# Patient Record
Sex: FEMALE | Race: WHITE | NOT HISPANIC OR LATINO | Employment: UNEMPLOYED | URBAN - NONMETROPOLITAN AREA
[De-identification: names, ages, dates, MRNs, and addresses within clinical notes are randomized per-mention and may not be internally consistent; named-entity substitution may affect disease eponyms.]

---

## 2021-08-03 ENCOUNTER — OFFICE VISIT (OUTPATIENT)
Dept: FAMILY MEDICINE | Facility: CLINIC | Age: 41
End: 2021-08-03
Payer: COMMERCIAL

## 2021-08-03 VITALS
WEIGHT: 138.38 LBS | HEART RATE: 110 BPM | OXYGEN SATURATION: 99 % | RESPIRATION RATE: 18 BRPM | TEMPERATURE: 98 F | SYSTOLIC BLOOD PRESSURE: 118 MMHG | DIASTOLIC BLOOD PRESSURE: 80 MMHG

## 2021-08-03 DIAGNOSIS — G43.909 MIGRAINE WITHOUT STATUS MIGRAINOSUS, NOT INTRACTABLE, UNSPECIFIED MIGRAINE TYPE: ICD-10-CM

## 2021-08-03 DIAGNOSIS — L02.01 CUTANEOUS ABSCESS OF FACE: ICD-10-CM

## 2021-08-03 DIAGNOSIS — F32.A DEPRESSION, UNSPECIFIED DEPRESSION TYPE: Primary | ICD-10-CM

## 2021-08-03 DIAGNOSIS — F41.9 ANXIETY: ICD-10-CM

## 2021-08-03 PROCEDURE — 99213 OFFICE O/P EST LOW 20 MIN: CPT | Mod: ,,, | Performed by: NURSE PRACTITIONER

## 2021-08-03 PROCEDURE — 99213 PR OFFICE/OUTPT VISIT, EST, LEVL III, 20-29 MIN: ICD-10-PCS | Mod: ,,, | Performed by: NURSE PRACTITIONER

## 2021-08-03 RX ORDER — CLINDAMYCIN HYDROCHLORIDE 300 MG/1
300 CAPSULE ORAL 2 TIMES DAILY
Qty: 20 CAPSULE | Refills: 0 | Status: SHIPPED | OUTPATIENT
Start: 2021-08-03 | End: 2021-12-21

## 2021-08-03 RX ORDER — PSEUDOEPHEDRINE HYDROCHLORIDE 60 MG/1
TABLET ORAL
COMMUNITY
Start: 2021-07-27 | End: 2021-08-03 | Stop reason: SDUPTHER

## 2021-08-03 RX ORDER — PSEUDOEPHEDRINE HYDROCHLORIDE 60 MG/1
TABLET ORAL
Qty: 60 TABLET | Refills: 5 | Status: SHIPPED | OUTPATIENT
Start: 2021-08-03 | End: 2022-02-23 | Stop reason: SDUPTHER

## 2021-08-03 RX ORDER — ESCITALOPRAM OXALATE 10 MG/1
10 TABLET ORAL NIGHTLY
Qty: 30 TABLET | Refills: 5 | Status: SHIPPED | OUTPATIENT
Start: 2021-08-03 | End: 2022-02-23 | Stop reason: DRUGHIGH

## 2021-08-03 RX ORDER — ESCITALOPRAM OXALATE 10 MG/1
10 TABLET ORAL NIGHTLY
COMMUNITY
Start: 2021-07-27 | End: 2021-08-03 | Stop reason: SDUPTHER

## 2021-08-03 RX ORDER — HYDROXYZINE PAMOATE 50 MG/1
CAPSULE ORAL
Qty: 90 CAPSULE | Refills: 1 | Status: SHIPPED | OUTPATIENT
Start: 2021-08-03 | End: 2021-09-13 | Stop reason: SDUPTHER

## 2021-08-03 RX ORDER — HYDROXYZINE PAMOATE 50 MG/1
CAPSULE ORAL
COMMUNITY
Start: 2021-07-17 | End: 2021-08-03 | Stop reason: SDUPTHER

## 2021-08-03 RX ORDER — TRAZODONE HYDROCHLORIDE 100 MG/1
100 TABLET ORAL NIGHTLY
Qty: 30 TABLET | Refills: 5 | Status: SHIPPED | OUTPATIENT
Start: 2021-08-03 | End: 2022-02-23 | Stop reason: SDUPTHER

## 2021-08-03 RX ORDER — TRAZODONE HYDROCHLORIDE 100 MG/1
100 TABLET ORAL NIGHTLY
COMMUNITY
Start: 2021-07-27 | End: 2021-08-03 | Stop reason: SDUPTHER

## 2021-09-13 DIAGNOSIS — F41.9 ANXIETY: ICD-10-CM

## 2021-09-13 RX ORDER — HYDROXYZINE PAMOATE 50 MG/1
CAPSULE ORAL
Qty: 90 CAPSULE | Refills: 1 | Status: SHIPPED | OUTPATIENT
Start: 2021-09-13 | End: 2021-10-18 | Stop reason: SDUPTHER

## 2021-10-18 DIAGNOSIS — F41.9 ANXIETY: ICD-10-CM

## 2021-10-18 RX ORDER — HYDROXYZINE PAMOATE 50 MG/1
CAPSULE ORAL
Qty: 90 CAPSULE | Refills: 1 | Status: SHIPPED | OUTPATIENT
Start: 2021-10-18 | End: 2021-12-21 | Stop reason: SDUPTHER

## 2021-12-21 DIAGNOSIS — F41.9 ANXIETY: ICD-10-CM

## 2021-12-21 RX ORDER — HYDROXYZINE PAMOATE 50 MG/1
CAPSULE ORAL
Qty: 90 CAPSULE | Refills: 2 | Status: SHIPPED | OUTPATIENT
Start: 2021-12-21 | End: 2022-06-06 | Stop reason: SDUPTHER

## 2022-01-27 ENCOUNTER — OFFICE VISIT (OUTPATIENT)
Dept: FAMILY MEDICINE | Facility: CLINIC | Age: 42
End: 2022-01-27
Payer: COMMERCIAL

## 2022-01-27 VITALS
SYSTOLIC BLOOD PRESSURE: 110 MMHG | HEART RATE: 99 BPM | TEMPERATURE: 98 F | RESPIRATION RATE: 18 BRPM | DIASTOLIC BLOOD PRESSURE: 80 MMHG | HEIGHT: 57 IN | OXYGEN SATURATION: 96 % | BODY MASS INDEX: 29.77 KG/M2 | WEIGHT: 138 LBS

## 2022-01-27 DIAGNOSIS — J06.9 UPPER RESPIRATORY TRACT INFECTION, UNSPECIFIED TYPE: Primary | ICD-10-CM

## 2022-01-27 DIAGNOSIS — U07.1 COVID-19: ICD-10-CM

## 2022-01-27 PROCEDURE — 99213 PR OFFICE/OUTPT VISIT, EST, LEVL III, 20-29 MIN: ICD-10-PCS | Mod: ,,, | Performed by: NURSE PRACTITIONER

## 2022-01-27 PROCEDURE — 99213 OFFICE O/P EST LOW 20 MIN: CPT | Mod: ,,, | Performed by: NURSE PRACTITIONER

## 2022-01-27 RX ORDER — AZITHROMYCIN 250 MG/1
TABLET, FILM COATED ORAL
Qty: 6 TABLET | Refills: 0 | Status: SHIPPED | OUTPATIENT
Start: 2022-01-27 | End: 2022-02-01

## 2022-01-27 NOTE — LETTER
January 27, 2022      [facility]  [address]  [place] [address]  Phone: [phone]  Fax: [phone]       Patient: Jaleesa Moreira   YOB: 1980  Date of Visit: 01/27/2022    To Whom It May Concern:    Carolina Moreira  was at [facility] on 01/27/2022. The patient may return to work/school on 01/31/2022 if symptoms have resolved with no restrictions. If you have any questions or/ concerns, or if I can be of further assistance, please do not hesitate to contact me.    Sincerely,    MADIHA Vilallba   /

## 2022-01-27 NOTE — PROGRESS NOTES
MADIHA Villalba   [place] [address]  [place]  [address]      PATIENT NAME: Jaleesa Moreira  : 1980  DATE: 22  MRN: [id number]      Billing Provider: MADIHA Villalba  Level of Service:   Patient PCP Information     Provider PCP Type    MADIHA Sahu General          Reason for Visit / Chief Complaint: Headache (Sinus pressure x 1 week.), Nasal Congestion (Denies fever, n/v/d or loss of smell or taste.  Daughter tested positive for Covid last week.), and Ear Fullness       Update PCP  Update Chief Complaint         History of Present Illness / Problem Focused Workflow     Jaleesa Moreira presents to the clinic with Headache (Sinus pressure x 1 week.), Nasal Congestion (Denies fever, n/v/d or loss of smell or taste.  Daughter tested positive for Covid last week.), and Ear Fullness     Patient presents to clinic with c/o congestion, sinus pressure, ears pressure x one week.       Review of Systems     @Review of Systems   HENT: Positive for nasal congestion, postnasal drip, rhinorrhea and sinus pressure/congestion. Negative for ear pain.    Respiratory: Negative for cough.    Cardiovascular: Negative for chest pain.   Neurological: Positive for headaches.       Medical / Social / Family History     Past Medical History:   Diagnosis Date    Anxiety        Past Surgical History:   Procedure Laterality Date    HYSTERECTOMY      TONSILLECTOMY         Social History  Ms.  reports that she has been smoking cigarettes. She has never used smokeless tobacco. She reports previous alcohol use. She reports that she does not use drugs.    Family History  Ms.'s family history includes Hyperlipidemia in her father; Thyroid disease in her father.    Medications and Allergies     Medications  Outpatient Medications Marked as Taking for the 22 encounter (Office Visit) with MADIHA Villalba   Medication Sig Dispense Refill    EScitalopram oxalate (LEXAPRO) 10 MG tablet Take  1 tablet (10 mg total) by mouth nightly. 30 tablet 5    hydrOXYzine pamoate (VISTARIL) 50 MG Cap TAKE 2 CAPSULES BY MOUTH EVERY 4 TO 6 HOURS AS NEEDED FOR ANXIETY 90 capsule 2    SUDOGEST 60 mg tablet TAKE 1 TABLET BY MOUTH EVERY TWELVE HOURS AS NEEDED 60 tablet 5    sumatriptan (IMITREX) 50 MG tablet TAKE 1 TABLET BY MOUTH at onset OF HEADACHE, REPEAT EVERY 2 hours UNTIL RELIEF, max 4 TABLETS in 24 hours      traZODone (DESYREL) 100 MG tablet Take 1 tablet (100 mg total) by mouth nightly. 30 tablet 5       Allergies  Review of patient's allergies indicates:  No Known Allergies    Physical Examination     Vitals:    01/27/22 1022   BP: 110/80   Pulse: 99   Resp: 18   Temp: 98.4 °F (36.9 °C)     Physical Exam  Constitutional:       General: She is not in acute distress.     Appearance: Normal appearance.   Cardiovascular:      Rate and Rhythm: Normal rate and regular rhythm.   Pulmonary:      Effort: Pulmonary effort is normal. No respiratory distress.      Breath sounds: Normal breath sounds. No wheezing, rhonchi or rales.   Skin:     General: Skin is warm and dry.   Neurological:      Mental Status: She is alert.               No results found for: WBC, HGB, HCT, MCV, PLT       No results found for: NA, K, CL, CO2, GLU, BUN, CREATININE, CALCIUM, PROT, ALBUMIN, BILITOT, ALKPHOS, AST, ALT, ANIONGAP, ESTGFRAFRICA, EGFRNONAA   No image results found.     Procedures   Assessment and Plan (including Health Maintenance)      Problem List  Smart Sets  Document Outside HM   :    Plan:           Problem List Items Addressed This Visit        ENT    Upper respiratory tract infection - Primary    Relevant Medications    azithromycin (Z-MARIE) 250 MG tablet       Other    COVID-19    Current Assessment & Plan     Vitamin d, c , zinc  otc antihistamine / decongestant  Push fluids  Maintain physical activity                The patient has no Health Maintenance topics of status Not Due    No future appointments.     Health  Maintenance Due   Topic Date Due    Hepatitis C Screening  Never done    Lipid Panel  Never done    COVID-19 Vaccine (1) Never done    Pneumococcal Vaccines (Age 0-64) (1 of 2 - PPSV23) Never done    HIV Screening  Never done    TETANUS VACCINE  Never done    Influenza Vaccine (1) Never done        Follow up if symptoms worsen or fail to improve.     Signature:  MADIHA Villalba  [facility]  [address] [address] [place]  [address]    Date of encounter: 1/27/22

## 2022-02-23 ENCOUNTER — OFFICE VISIT (OUTPATIENT)
Dept: FAMILY MEDICINE | Facility: CLINIC | Age: 42
End: 2022-02-23
Payer: COMMERCIAL

## 2022-02-23 VITALS
OXYGEN SATURATION: 98 % | DIASTOLIC BLOOD PRESSURE: 80 MMHG | SYSTOLIC BLOOD PRESSURE: 122 MMHG | WEIGHT: 138.63 LBS | BODY MASS INDEX: 29.91 KG/M2 | HEART RATE: 90 BPM | RESPIRATION RATE: 20 BRPM | HEIGHT: 57 IN | TEMPERATURE: 98 F

## 2022-02-23 DIAGNOSIS — Z79.899 ENCOUNTER FOR LONG-TERM (CURRENT) USE OF MEDICATIONS: ICD-10-CM

## 2022-02-23 DIAGNOSIS — F41.9 ANXIETY: ICD-10-CM

## 2022-02-23 DIAGNOSIS — F32.A DEPRESSION, UNSPECIFIED DEPRESSION TYPE: Primary | ICD-10-CM

## 2022-02-23 DIAGNOSIS — G43.909 MIGRAINE WITHOUT STATUS MIGRAINOSUS, NOT INTRACTABLE, UNSPECIFIED MIGRAINE TYPE: ICD-10-CM

## 2022-02-23 LAB
ALBUMIN SERPL BCP-MCNC: 3.8 G/DL (ref 3.5–5)
ALBUMIN/GLOB SERPL: 1 {RATIO}
ALP SERPL-CCNC: 105 U/L (ref 37–98)
ALT SERPL W P-5'-P-CCNC: 25 U/L (ref 13–56)
ANION GAP SERPL CALCULATED.3IONS-SCNC: 9 MMOL/L (ref 7–16)
AST SERPL W P-5'-P-CCNC: 19 U/L (ref 15–37)
BASOPHILS # BLD AUTO: 0.08 K/UL (ref 0–0.2)
BASOPHILS NFR BLD AUTO: 0.7 % (ref 0–1)
BILIRUB SERPL-MCNC: 0.3 MG/DL (ref 0–1.2)
BUN SERPL-MCNC: 8 MG/DL (ref 7–18)
BUN/CREAT SERPL: 11 (ref 6–20)
CALCIUM SERPL-MCNC: 9.3 MG/DL (ref 8.5–10.1)
CHLORIDE SERPL-SCNC: 107 MMOL/L (ref 98–107)
CHOLEST SERPL-MCNC: 268 MG/DL (ref 0–200)
CHOLEST/HDLC SERPL: 7.9 {RATIO}
CO2 SERPL-SCNC: 27 MMOL/L (ref 21–32)
CREAT SERPL-MCNC: 0.72 MG/DL (ref 0.55–1.02)
DIFFERENTIAL METHOD BLD: ABNORMAL
EOSINOPHIL # BLD AUTO: 0.23 K/UL (ref 0–0.5)
EOSINOPHIL NFR BLD AUTO: 1.9 % (ref 1–4)
ERYTHROCYTE [DISTWIDTH] IN BLOOD BY AUTOMATED COUNT: 13.6 % (ref 11.5–14.5)
GLOBULIN SER-MCNC: 4 G/DL (ref 2–4)
GLUCOSE SERPL-MCNC: 89 MG/DL (ref 74–106)
HCT VFR BLD AUTO: 44.8 % (ref 38–47)
HDLC SERPL-MCNC: 34 MG/DL (ref 40–60)
HGB BLD-MCNC: 15.4 G/DL (ref 12–16)
IMM GRANULOCYTES # BLD AUTO: 0.09 K/UL (ref 0–0.04)
IMM GRANULOCYTES NFR BLD: 0.7 % (ref 0–0.4)
LDLC SERPL CALC-MCNC: ABNORMAL MG/DL
LDLC/HDLC SERPL: ABNORMAL {RATIO}
LYMPHOCYTES # BLD AUTO: 4.16 K/UL (ref 1–4.8)
LYMPHOCYTES NFR BLD AUTO: 33.8 % (ref 27–41)
MCH RBC QN AUTO: 31.1 PG (ref 27–31)
MCHC RBC AUTO-ENTMCNC: 34.4 G/DL (ref 32–36)
MCV RBC AUTO: 90.5 FL (ref 80–96)
MONOCYTES # BLD AUTO: 0.7 K/UL (ref 0–0.8)
MONOCYTES NFR BLD AUTO: 5.7 % (ref 2–6)
MPC BLD CALC-MCNC: 9.3 FL (ref 9.4–12.4)
NEUTROPHILS # BLD AUTO: 7.04 K/UL (ref 1.8–7.7)
NEUTROPHILS NFR BLD AUTO: 57.2 % (ref 53–65)
NONHDLC SERPL-MCNC: 234 MG/DL
NRBC # BLD AUTO: 0 X10E3/UL
NRBC, AUTO (.00): 0 %
PLATELET # BLD AUTO: 502 K/UL (ref 150–400)
POTASSIUM SERPL-SCNC: 3.8 MMOL/L (ref 3.5–5.1)
PROT SERPL-MCNC: 7.8 G/DL (ref 6.4–8.2)
RBC # BLD AUTO: 4.95 M/UL (ref 4.2–5.4)
SODIUM SERPL-SCNC: 139 MMOL/L (ref 136–145)
TRIGL SERPL-MCNC: 520 MG/DL (ref 35–150)
VLDLC SERPL-MCNC: ABNORMAL MG/DL
WBC # BLD AUTO: 12.3 K/UL (ref 4.5–11)

## 2022-02-23 PROCEDURE — 80061 LIPID PANEL: ICD-10-PCS | Mod: ,,, | Performed by: CLINICAL MEDICAL LABORATORY

## 2022-02-23 PROCEDURE — 80053 COMPREHENSIVE METABOLIC PANEL: ICD-10-PCS | Mod: ,,, | Performed by: CLINICAL MEDICAL LABORATORY

## 2022-02-23 PROCEDURE — 99213 OFFICE O/P EST LOW 20 MIN: CPT | Mod: ,,, | Performed by: NURSE PRACTITIONER

## 2022-02-23 PROCEDURE — 80053 COMPREHEN METABOLIC PANEL: CPT | Mod: ,,, | Performed by: CLINICAL MEDICAL LABORATORY

## 2022-02-23 PROCEDURE — 85025 COMPLETE CBC W/AUTO DIFF WBC: CPT | Mod: ,,, | Performed by: CLINICAL MEDICAL LABORATORY

## 2022-02-23 PROCEDURE — 99213 PR OFFICE/OUTPT VISIT, EST, LEVL III, 20-29 MIN: ICD-10-PCS | Mod: ,,, | Performed by: NURSE PRACTITIONER

## 2022-02-23 PROCEDURE — 80061 LIPID PANEL: CPT | Mod: ,,, | Performed by: CLINICAL MEDICAL LABORATORY

## 2022-02-23 PROCEDURE — 85025 CBC WITH DIFFERENTIAL: ICD-10-PCS | Mod: ,,, | Performed by: CLINICAL MEDICAL LABORATORY

## 2022-02-23 RX ORDER — PSEUDOEPHEDRINE HYDROCHLORIDE 60 MG/1
TABLET ORAL
Qty: 60 TABLET | Refills: 5 | Status: SHIPPED | OUTPATIENT
Start: 2022-02-23 | End: 2022-06-06 | Stop reason: SDUPTHER

## 2022-02-23 RX ORDER — ESCITALOPRAM OXALATE 20 MG/1
20 TABLET ORAL DAILY
Qty: 30 TABLET | Refills: 5 | Status: SHIPPED | OUTPATIENT
Start: 2022-02-23 | End: 2022-06-06 | Stop reason: SDUPTHER

## 2022-02-23 RX ORDER — TRAZODONE HYDROCHLORIDE 100 MG/1
100 TABLET ORAL NIGHTLY
Qty: 30 TABLET | Refills: 5 | Status: SHIPPED | OUTPATIENT
Start: 2022-02-23 | End: 2022-06-06 | Stop reason: SDUPTHER

## 2022-02-23 RX ORDER — SUMATRIPTAN 50 MG/1
TABLET, FILM COATED ORAL
Qty: 9 TABLET | Refills: 5 | Status: SHIPPED | OUTPATIENT
Start: 2022-02-23 | End: 2022-06-06 | Stop reason: SDUPTHER

## 2022-02-23 RX ORDER — HYDROXYZINE PAMOATE 50 MG/1
CAPSULE ORAL
Qty: 90 CAPSULE | Refills: 2 | Status: CANCELLED | OUTPATIENT
Start: 2022-02-23

## 2022-02-23 RX ORDER — ESCITALOPRAM OXALATE 10 MG/1
10 TABLET ORAL NIGHTLY
Qty: 30 TABLET | Refills: 5 | Status: CANCELLED | OUTPATIENT
Start: 2022-02-23

## 2022-02-23 NOTE — PROGRESS NOTES
MADIHA Sahu   [facility]  [address]  [place] [address]     PATIENT NAME: Jaleesa Moreira  : 1980  DATE: 22  MRN: [id number]      Billing Provider: MADIHA Sahu  Level of Service: GA OFFICE/OUTPT VISIT, EST, LEVL III, 20-29 MIN  Patient PCP Information     Provider PCP Type    MADIHA Sahu General          Reason for Visit / Chief Complaint: Depression (Here for check up and med refills.)       Update PCP  Update Chief Complaint         History of Present Illness / Problem Focused Workflow     Jaleesa Moreira presents to the clinic for follow up on chronic conditions.  Hx of depression      Review of Systems     Review of Systems   Constitutional: Negative.  Negative for activity change, appetite change and unexpected weight change.   Eyes: Negative for visual disturbance.   Respiratory: Negative for chest tightness and shortness of breath.    Cardiovascular: Negative for chest pain.   Gastrointestinal: Negative for abdominal pain, change in bowel habit, nausea, vomiting and change in bowel habit.   Neurological: Negative for weakness and headaches.   Psychiatric/Behavioral: Negative for behavioral problems.        Medical / Social / Family History     Past Medical History:   Diagnosis Date    Anxiety     Migraines        Past Surgical History:   Procedure Laterality Date    HYSTERECTOMY      TONSILLECTOMY         Social History  Ms.  reports that she has been smoking cigarettes. She has never used smokeless tobacco. She reports previous alcohol use. She reports that she does not use drugs.    Family History  Ms.'s family history includes Hyperlipidemia in her father; Thyroid disease in her father.    Medications and Allergies     Medications  Outpatient Medications Marked as Taking for the 22 encounter (Office Visit) with MADIHA [place]   Medication Sig Dispense Refill    hydrOXYzine pamoate (VISTARIL) 50 MG Cap TAKE 2 CAPSULES BY  "MOUTH EVERY 4 TO 6 HOURS AS NEEDED FOR ANXIETY 90 capsule 2    [DISCONTINUED] EScitalopram oxalate (LEXAPRO) 10 MG tablet Take 1 tablet (10 mg total) by mouth nightly. 30 tablet 5    [DISCONTINUED] SUDOGEST 60 mg tablet TAKE 1 TABLET BY MOUTH EVERY TWELVE HOURS AS NEEDED 60 tablet 5    [DISCONTINUED] sumatriptan (IMITREX) 50 MG tablet TAKE 1 TABLET BY MOUTH at onset OF HEADACHE, REPEAT EVERY 2 hours UNTIL RELIEF, max 4 TABLETS in 24 hours      [DISCONTINUED] traZODone (DESYREL) 100 MG tablet Take 1 tablet (100 mg total) by mouth nightly. 30 tablet 5       Allergies  Review of patient's allergies indicates:  No Known Allergies    Physical Examination     Vitals:    02/23/22 1445   BP: 122/80   BP Location: Right arm   Patient Position: Sitting   BP Method: Large (Manual)   Pulse: 90   Resp: 20   Temp: 98 °F (36.7 °C)   TempSrc: Temporal   SpO2: 98%   Weight: 62.9 kg (138 lb 9.6 oz)   Height: 4' 9" (1.448 m)      Physical Exam  Constitutional:       General: She is not in acute distress.     Appearance: Normal appearance.   Eyes:      Pupils: Pupils are equal, round, and reactive to light.   Neck:      Thyroid: No thyromegaly.      Vascular: No carotid bruit.   Cardiovascular:      Rate and Rhythm: Normal rate and regular rhythm.      Pulses:           Carotid pulses are 3+ on the right side and 3+ on the left side.       Posterior tibial pulses are 3+ on the right side and 3+ on the left side.      Heart sounds: Normal heart sounds. No murmur heard.  Pulmonary:      Effort: Pulmonary effort is normal. No tachypnea, accessory muscle usage or respiratory distress.      Breath sounds: Normal breath sounds. No wheezing, rhonchi or rales.   Abdominal:      General: Bowel sounds are normal.      Palpations: Abdomen is soft.   Musculoskeletal:         General: Normal range of motion.      Cervical back: Neck supple.      Right lower leg: No edema.      Left lower leg: No edema.   Skin:     General: Skin is warm and dry. "      Coloration: Skin is not jaundiced or pale.   Neurological:      Mental Status: She is alert and oriented to person, place, and time.      Cranial Nerves: Cranial nerves are intact.      Gait: Gait is intact.   Psychiatric:         Attention and Perception: Attention normal.         Mood and Affect: Mood normal. Mood is not anxious.         Speech: Speech normal.         Behavior: Behavior is cooperative.         Thought Content: Thought content does not include suicidal ideation.         Judgment: Judgment normal.          Assessment and Plan (including Health Maintenance)      Problem List  Smart Sets  Document Outside HM   :      Health Maintenance Due   Topic Date Due    Hepatitis C Screening  Never done    COVID-19 Vaccine (1) Never done    Pneumococcal Vaccines (Age 0-64) (1 of 2 - PPSV23) Never done    HIV Screening  Never done    TETANUS VACCINE  Never done    Influenza Vaccine (1) Never done       Problem List Items Addressed This Visit    None     Visit Diagnoses     Depression, unspecified depression type    -  Primary    Will check routine labs and continue current medications.     Relevant Medications    traZODone (DESYREL) 100 MG tablet    Anxiety        Relevant Orders    CBC Auto Differential (Completed)    Comprehensive Metabolic Panel (Completed)    Migraine without status migrainosus, not intractable, unspecified migraine type        Relevant Medications    SUDOGEST 60 mg tablet    Encounter for long-term (current) use of medications        Relevant Orders    CBC Auto Differential (Completed)    Comprehensive Metabolic Panel (Completed)    Lipid Panel (Completed)        Depression, unspecified depression type  Comments:  Will check routine labs and continue current medications.   Orders:  -     traZODone (DESYREL) 100 MG tablet; Take 1 tablet (100 mg total) by mouth nightly.  Dispense: 30 tablet; Refill: 5    Anxiety  -     CBC Auto Differential; Future; Expected date: 02/23/2022  -      Comprehensive Metabolic Panel; Future; Expected date: 02/23/2022    Migraine without status migrainosus, not intractable, unspecified migraine type  -     SUDOGEST 60 mg tablet; TAKE 1 TABLET BY MOUTH EVERY TWELVE HOURS AS NEEDED  Dispense: 60 tablet; Refill: 5    Encounter for long-term (current) use of medications  -     CBC Auto Differential; Future; Expected date: 02/23/2022  -     Comprehensive Metabolic Panel; Future; Expected date: 02/23/2022  -     Lipid Panel; Future; Expected date: 02/23/2022    Other orders  -     sumatriptan (IMITREX) 50 MG tablet; TAKE 1 TABLET BY MOUTH at onset OF HEADACHE, REPEAT EVERY 2 hours UNTIL RELIEF, max 4 TABLETS in 24 hours  Dispense: 9 tablet; Refill: 5  -     EScitalopram oxalate (LEXAPRO) 20 MG tablet; Take 1 tablet (20 mg total) by mouth once daily.  Dispense: 30 tablet; Refill: 5       The patient has no Health Maintenance topics of status Not Due    Follow up in about 6 months (around 8/23/2022).     Signature:  MADIHA Sahu    Date of encounter: 2/23/22

## 2022-02-27 RX ORDER — ROSUVASTATIN CALCIUM 10 MG/1
10 TABLET, COATED ORAL DAILY
Qty: 90 TABLET | Refills: 0 | Status: SHIPPED | OUTPATIENT
Start: 2022-02-27 | End: 2022-06-03 | Stop reason: SDUPTHER

## 2022-06-06 ENCOUNTER — OFFICE VISIT (OUTPATIENT)
Dept: FAMILY MEDICINE | Facility: CLINIC | Age: 42
End: 2022-06-06
Payer: COMMERCIAL

## 2022-06-06 VITALS
OXYGEN SATURATION: 98 % | WEIGHT: 133.63 LBS | TEMPERATURE: 99 F | HEART RATE: 90 BPM | HEIGHT: 57 IN | SYSTOLIC BLOOD PRESSURE: 118 MMHG | RESPIRATION RATE: 20 BRPM | DIASTOLIC BLOOD PRESSURE: 84 MMHG | BODY MASS INDEX: 28.83 KG/M2

## 2022-06-06 DIAGNOSIS — F32.A DEPRESSION, UNSPECIFIED DEPRESSION TYPE: ICD-10-CM

## 2022-06-06 DIAGNOSIS — G43.909 MIGRAINE WITHOUT STATUS MIGRAINOSUS, NOT INTRACTABLE, UNSPECIFIED MIGRAINE TYPE: ICD-10-CM

## 2022-06-06 DIAGNOSIS — G47.00 INSOMNIA, UNSPECIFIED TYPE: ICD-10-CM

## 2022-06-06 DIAGNOSIS — L98.9 SKIN LESION: ICD-10-CM

## 2022-06-06 DIAGNOSIS — F41.9 ANXIETY: ICD-10-CM

## 2022-06-06 DIAGNOSIS — E78.2 MIXED HYPERLIPIDEMIA: Primary | ICD-10-CM

## 2022-06-06 LAB
CHOLEST SERPL-MCNC: 220 MG/DL (ref 0–200)
CHOLEST/HDLC SERPL: 5 {RATIO}
HDLC SERPL-MCNC: 44 MG/DL (ref 40–60)
LDLC SERPL CALC-MCNC: 114 MG/DL
LDLC/HDLC SERPL: 2.6 {RATIO}
NONHDLC SERPL-MCNC: 176 MG/DL
TRIGL SERPL-MCNC: 309 MG/DL (ref 35–150)
VLDLC SERPL-MCNC: 62 MG/DL

## 2022-06-06 PROCEDURE — 99213 OFFICE O/P EST LOW 20 MIN: CPT | Mod: ,,, | Performed by: NURSE PRACTITIONER

## 2022-06-06 PROCEDURE — 80061 LIPID PANEL: CPT | Mod: ,,, | Performed by: CLINICAL MEDICAL LABORATORY

## 2022-06-06 PROCEDURE — 99213 PR OFFICE/OUTPT VISIT, EST, LEVL III, 20-29 MIN: ICD-10-PCS | Mod: ,,, | Performed by: NURSE PRACTITIONER

## 2022-06-06 PROCEDURE — 80061 LIPID PANEL: ICD-10-PCS | Mod: ,,, | Performed by: CLINICAL MEDICAL LABORATORY

## 2022-06-06 RX ORDER — SUMATRIPTAN 50 MG/1
TABLET, FILM COATED ORAL
Qty: 9 TABLET | Refills: 5 | Status: SHIPPED | OUTPATIENT
Start: 2022-06-06 | End: 2024-03-04

## 2022-06-06 RX ORDER — ROSUVASTATIN CALCIUM 10 MG/1
10 TABLET, COATED ORAL DAILY
Qty: 90 TABLET | Refills: 1 | Status: SHIPPED | OUTPATIENT
Start: 2022-06-06 | End: 2022-12-09

## 2022-06-06 RX ORDER — PSEUDOEPHEDRINE HYDROCHLORIDE 60 MG/1
TABLET ORAL
Qty: 60 TABLET | Refills: 5 | Status: SHIPPED | OUTPATIENT
Start: 2022-06-06 | End: 2023-01-09

## 2022-06-06 RX ORDER — TRAZODONE HYDROCHLORIDE 100 MG/1
100 TABLET ORAL NIGHTLY
Qty: 30 TABLET | Refills: 5 | Status: SHIPPED | OUTPATIENT
Start: 2022-06-06 | End: 2023-01-09 | Stop reason: SDUPTHER

## 2022-06-06 RX ORDER — HYDROXYZINE PAMOATE 50 MG/1
CAPSULE ORAL
Qty: 90 CAPSULE | Refills: 2 | Status: SHIPPED | OUTPATIENT
Start: 2022-06-06 | End: 2022-10-07 | Stop reason: SDUPTHER

## 2022-06-06 RX ORDER — ESCITALOPRAM OXALATE 20 MG/1
20 TABLET ORAL DAILY
Qty: 30 TABLET | Refills: 5 | Status: SHIPPED | OUTPATIENT
Start: 2022-06-06 | End: 2023-01-09 | Stop reason: SDUPTHER

## 2022-06-06 NOTE — PROGRESS NOTES
MADIHA Sahu   [place] [facility]  [address]  [place] [address]     PATIENT NAME: Jaleesa Moreira  : 1980  DATE: 22  MRN: [id number]      Billing Provider: MADIHA Sahu  Level of Service: NE OFFICE/OUTPT VISIT, EST, LEVL III, 20-29 MIN  Patient PCP Information     Provider PCP Type    MADIHA Sahu General          Reason for Visit / Chief Complaint: Medication Refill, 3 month checkup, and Generalized Body Aches       Update PCP  Update Chief Complaint         History of Present Illness / Problem Focused Workflow     Jaleesa Moreira presents to the clinic for refills on routine medication.      Review of Systems     Review of Systems   Constitutional: Negative.  Negative for activity change, appetite change and unexpected weight change.   Eyes: Negative for visual disturbance.   Respiratory: Negative for cough, chest tightness and shortness of breath.    Cardiovascular: Negative for chest pain, palpitations and leg swelling.   Gastrointestinal: Negative for abdominal pain, nausea and vomiting.   Endocrine: Negative for cold intolerance, heat intolerance, polydipsia, polyphagia and polyuria.   Genitourinary: Negative for frequency.   Neurological: Negative for dizziness, weakness and headaches.        Medical / Social / Family History     Past Medical History:   Diagnosis Date    Anxiety     Migraines        Past Surgical History:   Procedure Laterality Date    HYSTERECTOMY      TONSILLECTOMY         Social History  Ms.  reports that she has been smoking cigarettes. She has never used smokeless tobacco. She reports previous alcohol use. She reports that she does not use drugs.    Family History  Ms.'s family history includes Hyperlipidemia in her father; Thyroid disease in her father.    Medications and Allergies     Medications  Outpatient Medications Marked as Taking for the 22 encounter (Office Visit) with MADIHA Espana   Medication Sig  "Dispense Refill    [DISCONTINUED] EScitalopram oxalate (LEXAPRO) 20 MG tablet Take 1 tablet (20 mg total) by mouth once daily. 30 tablet 5    [DISCONTINUED] hydrOXYzine pamoate (VISTARIL) 50 MG Cap TAKE 2 CAPSULES BY MOUTH EVERY 4 TO 6 HOURS AS NEEDED FOR ANXIETY 90 capsule 2    [DISCONTINUED] rosuvastatin (CRESTOR) 10 MG tablet TAKE 1 TABLET BY MOUTH DAILY 90 tablet 0    [DISCONTINUED] SUDOGEST 60 mg tablet TAKE 1 TABLET BY MOUTH EVERY TWELVE HOURS AS NEEDED 60 tablet 5    [DISCONTINUED] sumatriptan (IMITREX) 50 MG tablet TAKE 1 TABLET BY MOUTH at onset OF HEADACHE, REPEAT EVERY 2 hours UNTIL RELIEF, max 4 TABLETS in 24 hours 9 tablet 5    [DISCONTINUED] traZODone (DESYREL) 100 MG tablet Take 1 tablet (100 mg total) by mouth nightly. 30 tablet 5       Allergies  Review of patient's allergies indicates:  No Known Allergies    Physical Examination     Vitals:    06/06/22 1328   BP: 118/84   BP Location: Right arm   Patient Position: Sitting   BP Method: Medium (Manual)   Pulse: 90   Resp: 20   Temp: 98.5 °F (36.9 °C)   TempSrc: Oral   SpO2: 98%   Weight: 60.6 kg (133 lb 9.6 oz)   Height: 4' 9" (1.448 m)      Physical Exam  Constitutional:       General: She is not in acute distress.     Appearance: Normal appearance.   HENT:      Nose: No congestion.   Neck:      Thyroid: No thyromegaly.      Vascular: No carotid bruit.   Cardiovascular:      Rate and Rhythm: Normal rate and regular rhythm.      Heart sounds: No murmur heard.  Pulmonary:      Effort: Pulmonary effort is normal. No accessory muscle usage or respiratory distress.      Breath sounds: Normal breath sounds. No wheezing, rhonchi or rales.   Abdominal:      General: Bowel sounds are normal. There is no distension.      Palpations: Abdomen is soft.      Tenderness: There is no abdominal tenderness.   Musculoskeletal:         General: Normal range of motion.      Cervical back: Neck supple.   Skin:     General: Skin is warm and dry.      Capillary " Refill: Capillary refill takes less than 2 seconds.      Coloration: Skin is not jaundiced or pale.      Findings: Lesion present.          Neurological:      Mental Status: She is alert and oriented to person, place, and time.      Cranial Nerves: Cranial nerves are intact.      Gait: Gait is intact.   Psychiatric:         Mood and Affect: Mood normal.         Behavior: Behavior normal.         Thought Content: Thought content normal. Thought content does not include suicidal ideation.          Assessment and Plan (including Health Maintenance)      Problem List  Smart Sets  Document Outside HM   :        There are no preventive care reminders to display for this patient.    Problem List Items Addressed This Visit    None     Visit Diagnoses     Mixed hyperlipidemia    -  Primary    Fasting lipids ordered, continue current medication and LFLC diet. LDL goal < 70    Relevant Medications    rosuvastatin (CRESTOR) 10 MG tablet    Other Relevant Orders    Lipid Panel    Anxiety        Relevant Medications    hydrOXYzine pamoate (VISTARIL) 50 MG Cap    Depression, unspecified depression type        Will check routine labs and continue current medications.     Relevant Medications    EScitalopram oxalate (LEXAPRO) 20 MG tablet    Migraine without status migrainosus, not intractable, unspecified migraine type        Relevant Medications    sumatriptan (IMITREX) 50 MG tablet    SUDOGEST 60 mg tablet    Skin lesion        Will refer to dermatologist since lesion is big.    Relevant Orders    Ambulatory referral/consult to Dermatology    Insomnia, unspecified type        Relevant Medications    traZODone (DESYREL) 100 MG tablet        Mixed hyperlipidemia  Comments:  Fasting lipids ordered, continue current medication and LFLC diet. LDL goal < 70  Orders:  -     rosuvastatin (CRESTOR) 10 MG tablet; Take 1 tablet (10 mg total) by mouth once daily.  Dispense: 90 tablet; Refill: 1  -     Lipid Panel; Future; Expected date:  06/06/2022    Anxiety  -     hydrOXYzine pamoate (VISTARIL) 50 MG Cap; TAKE 2 CAPSULES BY MOUTH EVERY 4 TO 6 HOURS AS NEEDED FOR ANXIETY  Dispense: 90 capsule; Refill: 2    Depression, unspecified depression type  Comments:  Will check routine labs and continue current medications.   Orders:  -     EScitalopram oxalate (LEXAPRO) 20 MG tablet; Take 1 tablet (20 mg total) by mouth once daily.  Dispense: 30 tablet; Refill: 5    Migraine without status migrainosus, not intractable, unspecified migraine type  -     sumatriptan (IMITREX) 50 MG tablet; TAKE 1 TABLET BY MOUTH at onset OF HEADACHE, REPEAT EVERY 2 hours UNTIL RELIEF, max 4 TABLETS in 24 hours  Dispense: 9 tablet; Refill: 5  -     SUDOGEST 60 mg tablet; TAKE 1 TABLET BY MOUTH EVERY TWELVE HOURS AS NEEDED  Dispense: 60 tablet; Refill: 5    Skin lesion  Comments:  Will refer to dermatologist since lesion is big.  Orders:  -     Ambulatory referral/consult to Dermatology; Future; Expected date: 06/13/2022    Insomnia, unspecified type  -     traZODone (DESYREL) 100 MG tablet; Take 1 tablet (100 mg total) by mouth nightly.  Dispense: 30 tablet; Refill: 5       Health Maintenance Topics with due status: Not Due       Topic Last Completion Date    Influenza Vaccine Not Due       Procedures         Follow up in about 6 months (around 12/6/2022).     Signature:  MADIHA Sahu    Date of encounter: 6/6/22

## 2022-08-16 RX ORDER — MUPIROCIN 20 MG/G
OINTMENT TOPICAL 2 TIMES DAILY
Qty: 30 G | Refills: 0 | Status: SHIPPED | OUTPATIENT
Start: 2022-08-16

## 2022-11-08 ENCOUNTER — OFFICE VISIT (OUTPATIENT)
Dept: FAMILY MEDICINE | Facility: CLINIC | Age: 42
End: 2022-11-08
Payer: COMMERCIAL

## 2022-11-08 VITALS
SYSTOLIC BLOOD PRESSURE: 122 MMHG | OXYGEN SATURATION: 95 % | BODY MASS INDEX: 29.12 KG/M2 | DIASTOLIC BLOOD PRESSURE: 86 MMHG | HEART RATE: 105 BPM | TEMPERATURE: 98 F | WEIGHT: 135 LBS | HEIGHT: 57 IN | RESPIRATION RATE: 19 BRPM

## 2022-11-08 DIAGNOSIS — J10.1 INFLUENZA A: Primary | ICD-10-CM

## 2022-11-08 DIAGNOSIS — Z20.828 EXPOSURE TO THE FLU: ICD-10-CM

## 2022-11-08 LAB
CTP QC/QA: YES
FLUAV AG NPH QL: POSITIVE
FLUBV AG NPH QL: NEGATIVE

## 2022-11-08 PROCEDURE — 87804 POCT INFLUENZA A/B: ICD-10-PCS | Mod: 59,QW,, | Performed by: NURSE PRACTITIONER

## 2022-11-08 PROCEDURE — 99212 PR OFFICE/OUTPT VISIT, EST, LEVL II, 10-19 MIN: ICD-10-PCS | Mod: ,,, | Performed by: NURSE PRACTITIONER

## 2022-11-08 PROCEDURE — 99212 OFFICE O/P EST SF 10 MIN: CPT | Mod: ,,, | Performed by: NURSE PRACTITIONER

## 2022-11-08 PROCEDURE — 87804 INFLUENZA ASSAY W/OPTIC: CPT | Mod: QW,,, | Performed by: NURSE PRACTITIONER

## 2022-11-08 RX ORDER — OSELTAMIVIR PHOSPHATE 75 MG/1
75 CAPSULE ORAL 2 TIMES DAILY
Qty: 10 CAPSULE | Refills: 0 | Status: SHIPPED | OUTPATIENT
Start: 2022-11-08 | End: 2022-11-13

## 2022-11-08 NOTE — PROGRESS NOTES
Kimi Travis NP   [facility]  [address]  [place]  [address]      PATIENT NAME: Jaleesa Moreira  : 1980  DATE: 22  MRN: [id number]      Billing Provider: Kimi Travis NP  Level of Service: [place] OFFICE/OUTPT VISIT, EST, LEVL II, 10-19 MIN  Patient PCP Information       Provider PCP Type    [facility]            Reason for Visit / Chief Complaint: Cough (Pt's symptoms started X 2 days ago. Pt's  has tested positive for the Flu yesterday. ), Generalized Body Aches, and Headache       Update PCP  Update Chief Complaint         History of Present Illness / Problem Focused Workflow     Jaleesa Moreira presents to the clinic with Cough (Pt's symptoms started X 2 days ago. Pt's  has tested positive for the Flu yesterday. ), Generalized Body Aches, and Headache     42 year old female presents to clinic with complaints of cough, body aches, and headaches that started yesterday. Denies known fever. States her  tested positive for flu yesterday.     Review of Systems     @Review of Systems   Constitutional:  Positive for fatigue. Negative for activity change, appetite change and fever.   HENT:  Positive for nasal congestion, rhinorrhea, sinus pressure/congestion and sore throat. Negative for ear pain.    Eyes:  Negative for pain, redness, visual disturbance and eye dryness.   Respiratory:  Positive for cough. Negative for shortness of breath.    Cardiovascular:  Negative for chest pain and leg swelling.   Gastrointestinal:  Negative for abdominal distention, abdominal pain, constipation and diarrhea.   Endocrine: Negative for cold intolerance, heat intolerance and polyuria.   Genitourinary:  Negative for bladder incontinence, dysuria, frequency and urgency.   Musculoskeletal:  Positive for myalgias. Negative for arthralgias and gait problem.   Integumentary:  Negative for color change, rash and wound.   Allergic/Immunologic: Negative for  environmental allergies and food allergies.   Neurological:  Positive for headaches. Negative for dizziness, weakness and light-headedness.   Psychiatric/Behavioral:  Negative for behavioral problems and sleep disturbance.      Medical / Social / Family History     Past Medical History:   Diagnosis Date    Anxiety     Migraines        Past Surgical History:   Procedure Laterality Date    HYSTERECTOMY      TONSILLECTOMY         Social History  Ms.  reports that she has been smoking cigarettes. She has never used smokeless tobacco. She reports that she does not currently use alcohol. She reports that she does not use drugs.    Family History  Ms.'s family history includes Hyperlipidemia in her father; Thyroid disease in her father.    Medications and Allergies     Medications  Outpatient Medications Marked as Taking for the 11/8/22 encounter (Office Visit) with Kimi Travis NP   Medication Sig Dispense Refill    EScitalopram oxalate (LEXAPRO) 20 MG tablet Take 1 tablet (20 mg total) by mouth once daily. 30 tablet 5    hydrOXYzine pamoate (VISTARIL) 50 MG Cap TAKE 2 CAPSULES BY MOUTH EVERY 4 TO 6 HOURS AS NEEDED FOR ANXIETY 90 capsule 5    mupirocin (BACTROBAN) 2 % ointment Apply topically 2 (two) times daily. 30 g 0    rosuvastatin (CRESTOR) 10 MG tablet Take 1 tablet (10 mg total) by mouth once daily. 90 tablet 1    SUDOGEST 60 mg tablet TAKE 1 TABLET BY MOUTH EVERY TWELVE HOURS AS NEEDED 60 tablet 5    sumatriptan (IMITREX) 50 MG tablet TAKE 1 TABLET BY MOUTH at onset OF HEADACHE, REPEAT EVERY 2 hours UNTIL RELIEF, max 4 TABLETS in 24 hours 9 tablet 5    traZODone (DESYREL) 100 MG tablet Take 1 tablet (100 mg total) by mouth nightly. 30 tablet 5       Allergies  Review of patient's allergies indicates:  No Known Allergies    Physical Examination     Vitals:    11/08/22 0846   BP: 122/86   Pulse: 105   Resp: 19   Temp: 98.2 °F (36.8 °C)     Physical Exam  Vitals and nursing note reviewed.   Constitutional:        Appearance: Normal appearance.   HENT:      Head: Normocephalic.      Right Ear: Tympanic membrane normal.      Left Ear: Tympanic membrane normal.      Nose: Congestion and rhinorrhea present. Rhinorrhea is purulent.      Right Turbinates: Pale.      Left Turbinates: Pale.      Right Sinus: Maxillary sinus tenderness and frontal sinus tenderness present.      Left Sinus: Maxillary sinus tenderness and frontal sinus tenderness present.      Mouth/Throat:      Lips: Pink.      Mouth: Mucous membranes are moist.      Pharynx: Oropharyngeal exudate (clear post nasal drainage.) and posterior oropharyngeal erythema present.   Eyes:      Conjunctiva/sclera: Conjunctivae normal.   Cardiovascular:      Rate and Rhythm: Normal rate and regular rhythm.      Pulses: Normal pulses.      Heart sounds: Normal heart sounds.   Pulmonary:      Effort: Pulmonary effort is normal.      Breath sounds: Normal breath sounds. No wheezing or rhonchi.   Abdominal:      General: Abdomen is flat. Bowel sounds are normal. There is no distension.      Palpations: Abdomen is soft.      Tenderness: There is no abdominal tenderness.   Musculoskeletal:         General: Normal range of motion.      Cervical back: Normal range of motion.   Skin:     General: Skin is warm and dry.      Capillary Refill: Capillary refill takes less than 2 seconds.   Neurological:      General: No focal deficit present.      Mental Status: She is alert and oriented to person, place, and time. Mental status is at baseline.   Psychiatric:         Mood and Affect: Mood normal.         Behavior: Behavior normal.             Lab Results   Component Value Date    WBC 12.30 (H) 02/23/2022    HGB 15.4 02/23/2022    HCT 44.8 02/23/2022    MCV 90.5 02/23/2022     (H) 02/23/2022          Sodium   Date Value Ref Range Status   02/23/2022 139 136 - 145 mmol/L Final     Potassium   Date Value Ref Range Status   02/23/2022 3.8 3.5 - 5.1 mmol/L Final     Chloride   Date Value  Ref Range Status   02/23/2022 107 98 - 107 mmol/L Final     CO2   Date Value Ref Range Status   02/23/2022 27 21 - 32 mmol/L Final     Glucose   Date Value Ref Range Status   02/23/2022 89 74 - 106 mg/dL Final     BUN   Date Value Ref Range Status   02/23/2022 8 7 - 18 mg/dL Final     Creatinine   Date Value Ref Range Status   02/23/2022 0.72 0.55 - 1.02 mg/dL Final     Calcium   Date Value Ref Range Status   02/23/2022 9.3 8.5 - 10.1 mg/dL Final     Total Protein   Date Value Ref Range Status   02/23/2022 7.8 6.4 - 8.2 g/dL Final     Albumin   Date Value Ref Range Status   02/23/2022 3.8 3.5 - 5.0 g/dL Final     Bilirubin, Total   Date Value Ref Range Status   02/23/2022 0.3 0.0 - 1.2 mg/dL Final     Alk Phos   Date Value Ref Range Status   02/23/2022 105 (H) 37 - 98 U/L Final     AST   Date Value Ref Range Status   02/23/2022 19 15 - 37 U/L Final     ALT   Date Value Ref Range Status   02/23/2022 25 13 - 56 U/L Final     Anion Gap   Date Value Ref Range Status   02/23/2022 9 7 - 16 mmol/L Final     eGFR   Date Value Ref Range Status   02/23/2022 96 >=60 mL/min/1.73m² Final      No image results found.     Procedures   Assessment and Plan (including Health Maintenance)      Problem List  Smart Sets  Document Outside HM   :    Plan:           Problem List Items Addressed This Visit          ID    Influenza A - Primary    Current Assessment & Plan     Reviewed pathology of current symptoms, medication side effects/risk/benefits/directions on taking medications, instructed to alternate OTC Tylenol/Motrin for fever/pain, increase fluid intake, monitor for discussed worsening symptoms that require re-evaluation in clinic or if after hours go to ER. Strict hand hygiene encouraged. Lysol surroundings. Patient is to take the Tamiflu as directed and Duraflu as needed. Patient verbalized understanding of treatment plan and denies any questions.             Relevant Medications    oseltamivir (TAMIFLU) 75 MG capsule     [id number]-acetaminophen 83--325 mg Tab     Other Visit Diagnoses       Exposure to the flu                The patient has no Health Maintenance topics of status Not Due    No future appointments.     Health Maintenance Due   Topic Date Due    Hepatitis C Screening  Never done    HIV Screening  Never done    Mammogram  Never done    Influenza Vaccine (1) Never done        No follow-ups on file.     Signature:  Kimi Travis NP  [place] [place] [id number] [place]  [address]    Date of encounter: 11/8/22

## 2022-11-08 NOTE — ASSESSMENT & PLAN NOTE
Reviewed pathology of current symptoms, medication side effects/risk/benefits/directions on taking medications, instructed to alternate OTC Tylenol/Motrin for fever/pain, increase fluid intake, monitor for discussed worsening symptoms that require re-evaluation in clinic or if after hours go to ER. Strict hand hygiene encouraged. Lysol surroundings. Patient is to take the Tamiflu as directed and Duraflu as needed. Patient verbalized understanding of treatment plan and denies any questions.

## 2022-12-08 DIAGNOSIS — E78.2 MIXED HYPERLIPIDEMIA: ICD-10-CM

## 2022-12-09 RX ORDER — ROSUVASTATIN CALCIUM 10 MG/1
TABLET, COATED ORAL
Qty: 30 TABLET | Refills: 0 | Status: SHIPPED | OUTPATIENT
Start: 2022-12-09 | End: 2023-01-09 | Stop reason: SDUPTHER

## 2023-01-09 ENCOUNTER — OFFICE VISIT (OUTPATIENT)
Dept: FAMILY MEDICINE | Facility: CLINIC | Age: 43
End: 2023-01-09
Payer: COMMERCIAL

## 2023-01-09 VITALS
TEMPERATURE: 98 F | RESPIRATION RATE: 20 BRPM | DIASTOLIC BLOOD PRESSURE: 84 MMHG | OXYGEN SATURATION: 98 % | BODY MASS INDEX: 30.63 KG/M2 | SYSTOLIC BLOOD PRESSURE: 122 MMHG | HEART RATE: 110 BPM | WEIGHT: 142 LBS | HEIGHT: 57 IN

## 2023-01-09 DIAGNOSIS — F41.9 ANXIETY: ICD-10-CM

## 2023-01-09 DIAGNOSIS — G43.009 MIGRAINE WITHOUT AURA AND WITHOUT STATUS MIGRAINOSUS, NOT INTRACTABLE: ICD-10-CM

## 2023-01-09 DIAGNOSIS — F32.A DEPRESSION, UNSPECIFIED DEPRESSION TYPE: ICD-10-CM

## 2023-01-09 DIAGNOSIS — E78.2 MIXED HYPERLIPIDEMIA: ICD-10-CM

## 2023-01-09 DIAGNOSIS — G47.00 INSOMNIA, UNSPECIFIED TYPE: ICD-10-CM

## 2023-01-09 PROBLEM — J06.9 UPPER RESPIRATORY TRACT INFECTION: Status: RESOLVED | Noted: 2022-01-27 | Resolved: 2023-01-09

## 2023-01-09 PROBLEM — U07.1 COVID-19: Status: RESOLVED | Noted: 2022-01-27 | Resolved: 2023-01-09

## 2023-01-09 PROBLEM — J10.1 INFLUENZA A: Status: RESOLVED | Noted: 2022-11-08 | Resolved: 2023-01-09

## 2023-01-09 LAB
ALBUMIN SERPL BCP-MCNC: 3.8 G/DL (ref 3.5–5)
ALBUMIN/GLOB SERPL: 1 {RATIO}
ALP SERPL-CCNC: 103 U/L (ref 37–98)
ALT SERPL W P-5'-P-CCNC: 38 U/L (ref 13–56)
ANION GAP SERPL CALCULATED.3IONS-SCNC: 9 MMOL/L (ref 7–16)
AST SERPL W P-5'-P-CCNC: 29 U/L (ref 15–37)
BASOPHILS # BLD AUTO: 0.09 K/UL (ref 0–0.2)
BASOPHILS NFR BLD AUTO: 0.6 % (ref 0–1)
BILIRUB SERPL-MCNC: 0.5 MG/DL (ref ?–1.2)
BUN SERPL-MCNC: 6 MG/DL (ref 7–18)
BUN/CREAT SERPL: 8 (ref 6–20)
CALCIUM SERPL-MCNC: 9.6 MG/DL (ref 8.5–10.1)
CHLORIDE SERPL-SCNC: 107 MMOL/L (ref 98–107)
CHOLEST SERPL-MCNC: 202 MG/DL (ref 0–200)
CHOLEST/HDLC SERPL: 4 {RATIO}
CO2 SERPL-SCNC: 28 MMOL/L (ref 21–32)
CREAT SERPL-MCNC: 0.75 MG/DL (ref 0.55–1.02)
DIFFERENTIAL METHOD BLD: ABNORMAL
EGFR (NO RACE VARIABLE) (RUSH/TITUS): 103 ML/MIN/1.73M²
EOSINOPHIL # BLD AUTO: 0.27 K/UL (ref 0–0.5)
EOSINOPHIL NFR BLD AUTO: 1.9 % (ref 1–4)
ERYTHROCYTE [DISTWIDTH] IN BLOOD BY AUTOMATED COUNT: 13.2 % (ref 11.5–14.5)
GLOBULIN SER-MCNC: 3.8 G/DL (ref 2–4)
GLUCOSE SERPL-MCNC: 60 MG/DL (ref 74–106)
HCT VFR BLD AUTO: 46 % (ref 38–47)
HDLC SERPL-MCNC: 50 MG/DL (ref 40–60)
HGB BLD-MCNC: 15.1 G/DL (ref 12–16)
IMM GRANULOCYTES # BLD AUTO: 0.07 K/UL (ref 0–0.04)
IMM GRANULOCYTES NFR BLD: 0.5 % (ref 0–0.4)
LDLC SERPL CALC-MCNC: 87 MG/DL
LDLC/HDLC SERPL: 1.7 {RATIO}
LYMPHOCYTES # BLD AUTO: 4.01 K/UL (ref 1–4.8)
LYMPHOCYTES NFR BLD AUTO: 28.5 % (ref 27–41)
MCH RBC QN AUTO: 31.2 PG (ref 27–31)
MCHC RBC AUTO-ENTMCNC: 32.8 G/DL (ref 32–36)
MCV RBC AUTO: 95 FL (ref 80–96)
MONOCYTES # BLD AUTO: 0.93 K/UL (ref 0–0.8)
MONOCYTES NFR BLD AUTO: 6.6 % (ref 2–6)
MPC BLD CALC-MCNC: 9.3 FL (ref 9.4–12.4)
NEUTROPHILS # BLD AUTO: 8.7 K/UL (ref 1.8–7.7)
NEUTROPHILS NFR BLD AUTO: 61.9 % (ref 53–65)
NONHDLC SERPL-MCNC: 152 MG/DL
NRBC # BLD AUTO: 0 X10E3/UL
NRBC, AUTO (.00): 0 %
PLATELET # BLD AUTO: 460 K/UL (ref 150–400)
POTASSIUM SERPL-SCNC: 4.2 MMOL/L (ref 3.5–5.1)
PROT SERPL-MCNC: 7.6 G/DL (ref 6.4–8.2)
RBC # BLD AUTO: 4.84 M/UL (ref 4.2–5.4)
SODIUM SERPL-SCNC: 140 MMOL/L (ref 136–145)
TRIGL SERPL-MCNC: 324 MG/DL (ref 35–150)
VLDLC SERPL-MCNC: 65 MG/DL
WBC # BLD AUTO: 14.07 K/UL (ref 4.5–11)

## 2023-01-09 PROCEDURE — 85025 COMPLETE CBC W/AUTO DIFF WBC: CPT | Mod: ,,, | Performed by: CLINICAL MEDICAL LABORATORY

## 2023-01-09 PROCEDURE — 99214 OFFICE O/P EST MOD 30 MIN: CPT | Mod: ,,, | Performed by: NURSE PRACTITIONER

## 2023-01-09 PROCEDURE — 85025 CBC WITH DIFFERENTIAL: ICD-10-PCS | Mod: ,,, | Performed by: CLINICAL MEDICAL LABORATORY

## 2023-01-09 PROCEDURE — 80061 LIPID PANEL: ICD-10-PCS | Mod: ,,, | Performed by: CLINICAL MEDICAL LABORATORY

## 2023-01-09 PROCEDURE — 99214 PR OFFICE/OUTPT VISIT, EST, LEVL IV, 30-39 MIN: ICD-10-PCS | Mod: ,,, | Performed by: NURSE PRACTITIONER

## 2023-01-09 PROCEDURE — 80053 COMPREHEN METABOLIC PANEL: CPT | Mod: ,,, | Performed by: CLINICAL MEDICAL LABORATORY

## 2023-01-09 PROCEDURE — 80053 COMPREHENSIVE METABOLIC PANEL: ICD-10-PCS | Mod: ,,, | Performed by: CLINICAL MEDICAL LABORATORY

## 2023-01-09 PROCEDURE — 80061 LIPID PANEL: CPT | Mod: ,,, | Performed by: CLINICAL MEDICAL LABORATORY

## 2023-01-09 RX ORDER — ESCITALOPRAM OXALATE 20 MG/1
40 TABLET ORAL DAILY
Qty: 60 TABLET | Refills: 5 | Status: SHIPPED | OUTPATIENT
Start: 2023-01-09 | End: 2023-06-22

## 2023-01-09 RX ORDER — ROSUVASTATIN CALCIUM 10 MG/1
10 TABLET, COATED ORAL DAILY
Qty: 90 TABLET | Refills: 1 | Status: SHIPPED | OUTPATIENT
Start: 2023-01-09 | End: 2023-01-12

## 2023-01-09 RX ORDER — RIMEGEPANT SULFATE 75 MG/75MG
75 TABLET, ORALLY DISINTEGRATING ORAL ONCE AS NEEDED
Qty: 9 TABLET | Refills: 1 | Status: SHIPPED | OUTPATIENT
Start: 2023-01-09 | End: 2023-01-09

## 2023-01-09 RX ORDER — SUMATRIPTAN 50 MG/1
TABLET, FILM COATED ORAL
Qty: 9 TABLET | Refills: 5 | Status: CANCELLED | OUTPATIENT
Start: 2023-01-09

## 2023-01-09 RX ORDER — HYDROXYZINE PAMOATE 50 MG/1
CAPSULE ORAL
Qty: 90 CAPSULE | Refills: 5 | Status: SHIPPED | OUTPATIENT
Start: 2023-01-09 | End: 2023-06-28 | Stop reason: SDUPTHER

## 2023-01-09 RX ORDER — TRAZODONE HYDROCHLORIDE 100 MG/1
100 TABLET ORAL NIGHTLY
Qty: 30 TABLET | Refills: 5 | Status: SHIPPED | OUTPATIENT
Start: 2023-01-09 | End: 2024-03-04 | Stop reason: SDUPTHER

## 2023-01-09 NOTE — PROGRESS NOTES
Kimi Travis NP   [facility] [address]  [place]  [address]      PATIENT NAME: Jaleesa Moreira  : 1980  DATE: 23  MRN: [id number]      Billing Provider: Kimi Travis NP  Level of Service: [place] OFFICE/OUTPT VISIT, EST, LEVL IV, 30-39 MIN  Patient PCP Information       Provider PCP Type    [facility]            Reason for Visit / Chief Complaint: Medication Refill (6 month check up)       Update PCP  Update Chief Complaint         History of Present Illness / Problem Focused Workflow     Jaleesa Moreira presents to the clinic with Medication Refill (6 month check up)     42 year old female presents to clinic for 6 month check up and medication refill. She reports Imitrex does not seem to be helping headaches. She reports she has 2-3 migraines per month that seem to be brought on with stress and anxiety. She has tried multiple different things for her anxiety including Zoloft, Buspar, Wellbutrin, Viibryd, and Xanax. She states some of them seemed to work but then stopped after she had been on them for a while. She wants to continue Lexapro but requests to increase her dosage.       Review of Systems     @Review of Systems   Constitutional:  Negative for activity change, appetite change, fatigue and fever.   HENT:  Negative for nasal congestion, ear pain, rhinorrhea, sinus pressure/congestion and sore throat.    Eyes:  Negative for pain, redness, visual disturbance and eye dryness.   Respiratory:  Negative for cough and shortness of breath.    Cardiovascular:  Negative for chest pain and leg swelling.   Gastrointestinal:  Negative for abdominal distention, abdominal pain, constipation and diarrhea.   Endocrine: Negative for cold intolerance, heat intolerance and polyuria.   Genitourinary:  Negative for bladder incontinence, dysuria, frequency and urgency.   Musculoskeletal:  Negative for arthralgias, gait problem and myalgias.   Integumentary:  Negative  for color change, rash and wound.   Allergic/Immunologic: Negative for environmental allergies and food allergies.   Neurological:  Positive for headaches. Negative for dizziness, weakness and light-headedness.   Psychiatric/Behavioral:  Positive for dysphoric mood. Negative for behavioral problems and sleep disturbance. The patient is nervous/anxious.      Medical / Social / Family History     Past Medical History:   Diagnosis Date    Anxiety     Migraines        Past Surgical History:   Procedure Laterality Date    HYSTERECTOMY      TONSILLECTOMY         Social History  Ms.  reports that she has been smoking cigarettes. She has been smoking an average of 1 pack per day. She has been exposed to tobacco smoke. She has never used smokeless tobacco. She reports that she does not currently use alcohol. She reports that she does not use drugs.    Family History  Ms.'s family history includes Hyperlipidemia in her father; Thyroid disease in her father.    Medications and Allergies     Medications  Outpatient Medications Marked as Taking for the 1/9/23 encounter (Office Visit) with Kimi Travis NP   Medication Sig Dispense Refill    mupirocin (BACTROBAN) 2 % ointment Apply topically 2 (two) times daily. 30 g 0    sumatriptan (IMITREX) 50 MG tablet TAKE 1 TABLET BY MOUTH at onset OF HEADACHE, REPEAT EVERY 2 hours UNTIL RELIEF, max 4 TABLETS in 24 hours 9 tablet 5    [DISCONTINUED] EScitalopram oxalate (LEXAPRO) 20 MG tablet Take 1 tablet (20 mg total) by mouth once daily. 30 tablet 5    [DISCONTINUED] hydrOXYzine pamoate (VISTARIL) 50 MG Cap TAKE 2 CAPSULES BY MOUTH EVERY 4 TO 6 HOURS AS NEEDED FOR ANXIETY 90 capsule 5    [DISCONTINUED] rosuvastatin (CRESTOR) 10 MG tablet TAKE 1 TABLET BY MOUTH DAILY 30 tablet 0    [DISCONTINUED] traZODone (DESYREL) 100 MG tablet Take 1 tablet (100 mg total) by mouth nightly. 30 tablet 5       Allergies  Review of patient's allergies indicates:  No Known Allergies    Physical  Examination     Vitals:    01/09/23 0916   BP: 122/84   Pulse: 110   Resp: 20   Temp: 97.5 °F (36.4 °C)     Physical Exam  Vitals and nursing note reviewed.   HENT:      Head: Normocephalic.      Right Ear: Tympanic membrane normal.      Left Ear: Tympanic membrane normal.      Nose: Nose normal.      Mouth/Throat:      Mouth: Mucous membranes are moist.      Pharynx: Oropharynx is clear. No posterior oropharyngeal erythema.   Eyes:      Conjunctiva/sclera: Conjunctivae normal.   Cardiovascular:      Rate and Rhythm: Normal rate and regular rhythm.      Pulses: Normal pulses.      Heart sounds: Normal heart sounds.   Pulmonary:      Effort: Pulmonary effort is normal.      Breath sounds: Normal breath sounds.   Abdominal:      General: Abdomen is flat. Bowel sounds are normal. There is no distension.      Palpations: Abdomen is soft.   Musculoskeletal:         General: No swelling or tenderness. Normal range of motion.      Cervical back: Normal range of motion.      Right lower leg: No edema.      Left lower leg: No edema.   Skin:     General: Skin is warm and dry.      Capillary Refill: Capillary refill takes less than 2 seconds.   Neurological:      Mental Status: She is alert. Mental status is at baseline.   Psychiatric:         Mood and Affect: Mood is anxious.         Behavior: Behavior normal.         Thought Content: Thought content does not include homicidal or suicidal ideation.             Lab Results   Component Value Date    WBC 12.30 (H) 02/23/2022    HGB 15.4 02/23/2022    HCT 44.8 02/23/2022    MCV 90.5 02/23/2022     (H) 02/23/2022          Sodium   Date Value Ref Range Status   02/23/2022 139 136 - 145 mmol/L Final     Potassium   Date Value Ref Range Status   02/23/2022 3.8 3.5 - 5.1 mmol/L Final     Chloride   Date Value Ref Range Status   02/23/2022 107 98 - 107 mmol/L Final     CO2   Date Value Ref Range Status   02/23/2022 27 21 - 32 mmol/L Final     Glucose   Date Value Ref Range  Status   02/23/2022 89 74 - 106 mg/dL Final     BUN   Date Value Ref Range Status   02/23/2022 8 7 - 18 mg/dL Final     Creatinine   Date Value Ref Range Status   02/23/2022 0.72 0.55 - 1.02 mg/dL Final     Calcium   Date Value Ref Range Status   02/23/2022 9.3 8.5 - 10.1 mg/dL Final     Total Protein   Date Value Ref Range Status   02/23/2022 7.8 6.4 - 8.2 g/dL Final     Albumin   Date Value Ref Range Status   02/23/2022 3.8 3.5 - 5.0 g/dL Final     Bilirubin, Total   Date Value Ref Range Status   02/23/2022 0.3 0.0 - 1.2 mg/dL Final     Alk Phos   Date Value Ref Range Status   02/23/2022 105 (H) 37 - 98 U/L Final     AST   Date Value Ref Range Status   02/23/2022 19 15 - 37 U/L Final     ALT   Date Value Ref Range Status   02/23/2022 25 13 - 56 U/L Final     Anion Gap   Date Value Ref Range Status   02/23/2022 9 7 - 16 mmol/L Final     eGFR   Date Value Ref Range Status   02/23/2022 96 >=60 mL/min/1.73m² Final      No image results found.     Procedures   Assessment and Plan (including Health Maintenance)      Problem List  Smart Sets  Document Outside HM   :    Plan:           Problem List Items Addressed This Visit          Neuro    Migraines    Current Assessment & Plan     Imitrex ineffective. Will start her on Nurtec to use as needed for migraines. Will also increase Lexapro to see if this will help prevent stress/anxiety which patient states is a trigger for her headaches.          Relevant Medications    rimegepant (NURTEC) 75 mg odt       Psychiatric    Anxiety    Current Assessment & Plan     Increase Lexapro to 40mg daily. Continue Trazodone at bedtime. Continue Vistaril as needed. Follow up in 3 months.          Relevant Medications    hydrOXYzine pamoate (VISTARIL) 50 MG Cap       Cardiac/Vascular    Mixed hyperlipidemia    Overview     Fasting lipids ordered, continue current medication and LFLC diet. LDL goal < 70         Current Assessment & Plan     Fasting lipid panel obtained today. Continue  current meds and low fat/low cholesterol diet. LDL goal is less than 70. Follow up in 3 months.   Lab Results   Component Value Date    CHOL 220 (H) 06/06/2022    CHOL 268 (H) 02/23/2022     Lab Results   Component Value Date    HDL 44 06/06/2022    HDL 34 (L) 02/23/2022     Lab Results   Component Value Date    LDLCALC 114 06/06/2022    LDLCALC  02/23/2022      Comment:      Unable to calculate due to one of the following values:  Cholesterol <5  HDL Cholesterol <5  Triglycerides <10 or >400     Lab Results   Component Value Date    TRIG 309 (H) 06/06/2022    TRIG 520 (H) 02/23/2022     Lab Results   Component Value Date    CHOLHDL 5.0 06/06/2022    CHOLHDL 7.9 02/23/2022              Relevant Medications    rosuvastatin (CRESTOR) 10 MG tablet    Other Relevant Orders    Lipid Panel    CBC Auto Differential    Comprehensive Metabolic Panel     Other Visit Diagnoses       Depression, unspecified depression type        Will check routine labs and continue current medications.     Relevant Medications    EScitalopram oxalate (LEXAPRO) 20 MG tablet    Insomnia, unspecified type        Relevant Medications    traZODone (DESYREL) 100 MG tablet            The patient has no Health Maintenance topics of status Not Due    No future appointments.     Health Maintenance Due   Topic Date Due    Hepatitis C Screening  Never done    HIV Screening  Never done    Mammogram  Never done    Hemoglobin A1c (Diabetic Prevention Screening)  Never done    Influenza Vaccine (1) Never done        No follow-ups on file.     Signature:  Kimi Travis NP  [facility]  [id number] [place] [address] [address]  [place]  [address]    Date of encounter: 1/9/23

## 2023-01-09 NOTE — ASSESSMENT & PLAN NOTE
Increase Lexapro to 40mg daily. Continue Trazodone at bedtime. Continue Vistaril as needed. Follow up in 3 months.

## 2023-01-09 NOTE — ASSESSMENT & PLAN NOTE
Imitrex ineffective. Will start her on Nurtec to use as needed for migraines. Will also increase Lexapro to see if this will help prevent stress/anxiety which patient states is a trigger for her headaches.

## 2023-01-09 NOTE — ASSESSMENT & PLAN NOTE
Fasting lipid panel obtained today. Continue current meds and low fat/low cholesterol diet. LDL goal is less than 70. Follow up in 3 months.   Lab Results   Component Value Date    CHOL 220 (H) 06/06/2022    CHOL 268 (H) 02/23/2022     Lab Results   Component Value Date    HDL 44 06/06/2022    HDL 34 (L) 02/23/2022     Lab Results   Component Value Date    LDLCALC 114 06/06/2022    LDLCALC  02/23/2022      Comment:      Unable to calculate due to one of the following values:  Cholesterol <5  HDL Cholesterol <5  Triglycerides <10 or >400     Lab Results   Component Value Date    TRIG 309 (H) 06/06/2022    TRIG 520 (H) 02/23/2022     Lab Results   Component Value Date    CHOLHDL 5.0 06/06/2022    CHOLHDL 7.9 02/23/2022

## 2023-01-12 RX ORDER — ROSUVASTATIN CALCIUM 20 MG/1
20 TABLET, COATED ORAL DAILY
Qty: 90 TABLET | Refills: 3 | Status: SHIPPED | OUTPATIENT
Start: 2023-01-12 | End: 2024-03-04 | Stop reason: SDUPTHER

## 2023-01-12 NOTE — PROGRESS NOTES
Labs reviewed: Lipid panel was elevated. We will increase Rosuvastatin to 20mg daily. Encourage patient to follow low fat/low cholesterol diet with increased exercise. Recommend recheck in 3 months.

## 2023-06-22 DIAGNOSIS — F32.A DEPRESSION, UNSPECIFIED DEPRESSION TYPE: ICD-10-CM

## 2023-06-22 RX ORDER — ESCITALOPRAM OXALATE 20 MG/1
TABLET ORAL
Qty: 60 TABLET | Refills: 5 | Status: SHIPPED | OUTPATIENT
Start: 2023-06-22 | End: 2024-03-04 | Stop reason: SDUPTHER

## 2023-06-28 DIAGNOSIS — F41.9 ANXIETY: ICD-10-CM

## 2023-06-28 RX ORDER — HYDROXYZINE PAMOATE 50 MG/1
CAPSULE ORAL
Qty: 90 CAPSULE | Refills: 5 | Status: SHIPPED | OUTPATIENT
Start: 2023-06-28 | End: 2024-03-04

## 2023-10-23 ENCOUNTER — OFFICE VISIT (OUTPATIENT)
Dept: FAMILY MEDICINE | Facility: CLINIC | Age: 43
End: 2023-10-23
Payer: COMMERCIAL

## 2023-10-23 VITALS
TEMPERATURE: 98 F | HEART RATE: 91 BPM | OXYGEN SATURATION: 98 % | HEIGHT: 57 IN | WEIGHT: 138 LBS | DIASTOLIC BLOOD PRESSURE: 78 MMHG | BODY MASS INDEX: 29.77 KG/M2 | SYSTOLIC BLOOD PRESSURE: 122 MMHG | RESPIRATION RATE: 17 BRPM

## 2023-10-23 DIAGNOSIS — M25.562 ACUTE PAIN OF LEFT KNEE: Primary | ICD-10-CM

## 2023-10-23 PROCEDURE — 99213 PR OFFICE/OUTPT VISIT, EST, LEVL III, 20-29 MIN: ICD-10-PCS | Mod: 25,,, | Performed by: NURSE PRACTITIONER

## 2023-10-23 PROCEDURE — 96372 THER/PROPH/DIAG INJ SC/IM: CPT | Mod: ,,, | Performed by: NURSE PRACTITIONER

## 2023-10-23 PROCEDURE — 96372 PR INJECTION,THERAP/PROPH/DIAG2ST, IM OR SUBCUT: ICD-10-PCS | Mod: ,,, | Performed by: NURSE PRACTITIONER

## 2023-10-23 PROCEDURE — 99213 OFFICE O/P EST LOW 20 MIN: CPT | Mod: 25,,, | Performed by: NURSE PRACTITIONER

## 2023-10-23 RX ORDER — KETOROLAC TROMETHAMINE 10 MG/1
10 TABLET, FILM COATED ORAL EVERY 6 HOURS PRN
Qty: 30 TABLET | Refills: 0 | Status: SHIPPED | OUTPATIENT
Start: 2023-10-23 | End: 2023-10-31

## 2023-10-23 RX ORDER — KETOROLAC TROMETHAMINE 30 MG/ML
30 INJECTION, SOLUTION INTRAMUSCULAR; INTRAVENOUS
Status: COMPLETED | OUTPATIENT
Start: 2023-10-23 | End: 2023-10-23

## 2023-10-23 RX ADMIN — KETOROLAC TROMETHAMINE 30 MG: 30 INJECTION, SOLUTION INTRAMUSCULAR; INTRAVENOUS at 09:10

## 2023-10-23 NOTE — PROGRESS NOTES
"   Kimi Travis NP   [place] [id number]  [place]  [address]      PATIENT NAME: Jaleesa Moreira  : 1980  DATE: 10/23/23  MRN: [id number]      Billing Provider: Kimi Travis NP  Level of Service: NM OFFICE/OUTPT VISIT, EST, LEVL III, 20-29 MIN  Patient PCP Information       Provider PCP Type    Kimi Travis NP General            Reason for Visit / Chief Complaint: Knee Pain (Pt c/o left knee pain that started suddenly 3 days ago. Pt states she was dancing and her left knee "gave out" and she fell. She did not fall onto her knee. Pt c/o swelling, has been treating with compression and elevation.)         History of Present Illness / Problem Focused Workflow     Jaleesa Moreira presents to the clinic with Knee Pain (Pt c/o left knee pain that started suddenly 3 days ago. Pt states she was dancing and her left knee "gave out" and she fell. She did not fall onto her knee. Pt c/o swelling, has been treating with compression and elevation.)     43 year old female presents to clinic with complaints of left knee pain. Pt states she was dancing and her left knee "gave out" and she fell. She did not fall onto her knee. Pt c/o swelling, has been treating with compression, ice, and elevation. Has been taking Ibuprofen which has helped some with pain.         Review of Systems     @Review of Systems   Constitutional:  Negative for activity change, appetite change, fatigue and fever.   HENT:  Negative for nasal congestion, ear pain, rhinorrhea, sinus pressure/congestion and sore throat.    Eyes:  Negative for pain, redness, visual disturbance and eye dryness.   Respiratory:  Negative for cough and shortness of breath.    Cardiovascular:  Negative for chest pain and leg swelling.   Gastrointestinal:  Negative for abdominal distention, abdominal pain, constipation and diarrhea.   Endocrine: Negative for cold intolerance, heat intolerance and polyuria.   Genitourinary:  Negative for " bladder incontinence, dysuria, frequency and urgency.   Musculoskeletal:  Positive for arthralgias. Negative for gait problem and myalgias.   Integumentary:  Negative for color change, rash and wound.   Allergic/Immunologic: Negative for environmental allergies and food allergies.   Neurological:  Negative for dizziness, weakness, light-headedness and headaches.   Psychiatric/Behavioral:  Negative for behavioral problems and sleep disturbance.        Medical / Social / Family History     Past Medical History:   Diagnosis Date    Anxiety     Hyperlipidemia     Migraines        Past Surgical History:   Procedure Laterality Date    HYSTERECTOMY      TONSILLECTOMY         Social History  Ms.  reports that she has been smoking cigarettes. She started smoking about 23 years ago. She has a 23.8 pack-year smoking history. She has been exposed to tobacco smoke. She has never used smokeless tobacco. She reports that she does not currently use alcohol. She reports that she does not use drugs.    Family History  Ms.'s family history includes Hyperlipidemia in her father; Thyroid disease in her father.    Medications and Allergies     Medications  Outpatient Medications Marked as Taking for the 10/23/23 encounter (Office Visit) with Kimi Travis NP   Medication Sig Dispense Refill    EScitalopram oxalate (LEXAPRO) 20 MG tablet TAKE 2 TABLETS BY MOUTH DAILY 60 tablet 5    hydrOXYzine pamoate (VISTARIL) 50 MG Cap Take 2 capsules every 4-6 hours as needed for anxiety. 90 capsule 5    mupirocin (BACTROBAN) 2 % ointment Apply topically 2 (two) times daily. 30 g 0    rosuvastatin (CRESTOR) 20 MG tablet Take 1 tablet (20 mg total) by mouth once daily. 90 tablet 3    sumatriptan (IMITREX) 50 MG tablet TAKE 1 TABLET BY MOUTH at onset OF HEADACHE, REPEAT EVERY 2 hours UNTIL RELIEF, max 4 TABLETS in 24 hours 9 tablet 5    traZODone (DESYREL) 100 MG tablet Take 1 tablet (100 mg total) by mouth nightly. 30 tablet 5     Current  Facility-Administered Medications for the 10/23/23 encounter (Office Visit) with Kimi Travis NP   Medication Dose Route Frequency Provider Last Rate Last Admin    [COMPLETED] ketorolac injection 30 mg  30 mg Intramuscular 1 time in Clinic/HOD Kimi Travis NP   30 mg at 10/23/23 0915       Allergies  Review of patient's allergies indicates:  No Known Allergies    Physical Examination     Vitals:    10/23/23 0825   BP: 122/78   Pulse: 91   Resp: 17   Temp: 98.1 °F (36.7 °C)     Physical Exam  Vitals and nursing note reviewed.   HENT:      Head: Normocephalic.      Right Ear: Tympanic membrane normal.      Left Ear: Tympanic membrane normal.      Nose: Nose normal.      Mouth/Throat:      Mouth: Mucous membranes are moist.      Pharynx: Oropharynx is clear. No posterior oropharyngeal erythema.   Eyes:      Conjunctiva/sclera: Conjunctivae normal.   Cardiovascular:      Rate and Rhythm: Normal rate and regular rhythm.      Pulses: Normal pulses.      Heart sounds: Normal heart sounds.   Pulmonary:      Effort: Pulmonary effort is normal.      Breath sounds: Normal breath sounds.   Abdominal:      General: Abdomen is flat. Bowel sounds are normal. There is no distension.      Palpations: Abdomen is soft.   Musculoskeletal:         General: No swelling.      Cervical back: Normal range of motion.      Left knee: Swelling and bony tenderness present. Decreased range of motion. Tenderness present over the MCL and LCL.      Right lower leg: No edema.      Left lower leg: No edema.      Comments: Pain to left knee on lateral and medial sides of patella.    Skin:     General: Skin is warm and dry.      Capillary Refill: Capillary refill takes less than 2 seconds.   Neurological:      Mental Status: She is alert. Mental status is at baseline.   Psychiatric:         Mood and Affect: Mood normal.         Behavior: Behavior normal.               Lab Results   Component Value Date    WBC 14.07 (H) 01/09/2023    HGB  15.1 01/09/2023    HCT 46.0 01/09/2023    MCV 95.0 01/09/2023     (H) 01/09/2023        CMP  Sodium   Date Value Ref Range Status   01/09/2023 140 136 - 145 mmol/L Final     Potassium   Date Value Ref Range Status   01/09/2023 4.2 3.5 - 5.1 mmol/L Final     Chloride   Date Value Ref Range Status   01/09/2023 107 98 - 107 mmol/L Final     CO2   Date Value Ref Range Status   01/09/2023 28 21 - 32 mmol/L Final     Glucose   Date Value Ref Range Status   01/09/2023 60 (L) 74 - 106 mg/dL Final     BUN   Date Value Ref Range Status   01/09/2023 6 (L) 7 - 18 mg/dL Final     Creatinine   Date Value Ref Range Status   01/09/2023 0.75 0.55 - 1.02 mg/dL Final     Calcium   Date Value Ref Range Status   01/09/2023 9.6 8.5 - 10.1 mg/dL Final     Total Protein   Date Value Ref Range Status   01/09/2023 7.6 6.4 - 8.2 g/dL Final     Albumin   Date Value Ref Range Status   01/09/2023 3.8 3.5 - 5.0 g/dL Final     Bilirubin, Total   Date Value Ref Range Status   01/09/2023 0.5 >0.0 - 1.2 mg/dL Final     Alk Phos   Date Value Ref Range Status   01/09/2023 103 (H) 37 - 98 U/L Final     AST   Date Value Ref Range Status   01/09/2023 29 15 - 37 U/L Final     ALT   Date Value Ref Range Status   01/09/2023 38 13 - 56 U/L Final     Anion Gap   Date Value Ref Range Status   01/09/2023 9 7 - 16 mmol/L Final     eGFR   Date Value Ref Range Status   01/09/2023 103 >=60 mL/min/1.73m² Final     Procedures   Assessment and Plan (including Health Maintenance)   :    Plan:           Problem List Items Addressed This Visit          Orthopedic    Acute pain of left knee - Primary    Current Assessment & Plan     Xray showed no abnormalities. Will treat with Toradol IM in clinic as well as orally as needed. Instructed do not take with other NSAIDs.   Patient was instructed to RICE, take medications as directed, alternate ice/moist heat to area, and monitor for worsening symptoms that require immediate evaluation. Patient was instructed to follow  up if symptoms persist past current treatment plan to discuss further options, such as physical therapy, referral to ortho or other diagnostic imaging. Medication side effects/risk/benefits/directions on taking medications were reviewed with patient. Patient verbalized understanding of treatment plan and denies any questions.           Relevant Medications    ketorolac (TORADOL) 10 mg tablet    ketorolac injection 30 mg (Completed)    Other Relevant Orders    X-Ray Knee 1 or 2 View Left (Completed)       The patient has no Health Maintenance topics of status Not Due    No future appointments.     Health Maintenance Due   Topic Date Due    Hepatitis C Screening  Never done    COVID-19 Vaccine (1) Never done    Pneumococcal Vaccines (Age 0-64) (1 - PCV) Never done    HIV Screening  Never done    TETANUS VACCINE  Never done    Mammogram  Never done    Hemoglobin A1c (Diabetic Prevention Screening)  Never done    Influenza Vaccine (1) Never done        No follow-ups on file.     Signature:  Kimi Travis NP  [facility]  [address] [address] [place]  [address]    Date of encounter: 10/23/23

## 2023-10-23 NOTE — ASSESSMENT & PLAN NOTE
Xray showed no abnormalities. Will treat with Toradol IM in clinic as well as orally as needed. Instructed do not take with other NSAIDs.   Patient was instructed to RICE, take medications as directed, alternate ice/moist heat to area, and monitor for worsening symptoms that require immediate evaluation. Patient was instructed to follow up if symptoms persist past current treatment plan to discuss further options, such as physical therapy, referral to ortho or other diagnostic imaging. Medication side effects/risk/benefits/directions on taking medications were reviewed with patient. Patient verbalized understanding of treatment plan and denies any questions.

## 2024-03-04 ENCOUNTER — OFFICE VISIT (OUTPATIENT)
Dept: FAMILY MEDICINE | Facility: CLINIC | Age: 44
End: 2024-03-04
Payer: COMMERCIAL

## 2024-03-04 VITALS
WEIGHT: 141 LBS | HEIGHT: 57 IN | OXYGEN SATURATION: 96 % | HEART RATE: 97 BPM | RESPIRATION RATE: 16 BRPM | SYSTOLIC BLOOD PRESSURE: 132 MMHG | TEMPERATURE: 98 F | DIASTOLIC BLOOD PRESSURE: 84 MMHG | BODY MASS INDEX: 30.42 KG/M2

## 2024-03-04 DIAGNOSIS — G47.00 INSOMNIA, UNSPECIFIED TYPE: ICD-10-CM

## 2024-03-04 DIAGNOSIS — F32.A DEPRESSION, UNSPECIFIED DEPRESSION TYPE: ICD-10-CM

## 2024-03-04 DIAGNOSIS — Z79.899 OTHER LONG TERM (CURRENT) DRUG THERAPY: Primary | ICD-10-CM

## 2024-03-04 DIAGNOSIS — Z13.1 ENCOUNTER FOR SCREENING FOR DIABETES MELLITUS: ICD-10-CM

## 2024-03-04 DIAGNOSIS — G43.909 MIGRAINE WITHOUT STATUS MIGRAINOSUS, NOT INTRACTABLE, UNSPECIFIED MIGRAINE TYPE: ICD-10-CM

## 2024-03-04 DIAGNOSIS — F41.9 ANXIETY: ICD-10-CM

## 2024-03-04 DIAGNOSIS — E78.2 MIXED HYPERLIPIDEMIA: ICD-10-CM

## 2024-03-04 LAB
ALBUMIN SERPL BCP-MCNC: 4.2 G/DL (ref 3.5–5)
ALBUMIN/GLOB SERPL: 1.3 {RATIO}
ALP SERPL-CCNC: 102 U/L (ref 37–98)
ALT SERPL W P-5'-P-CCNC: 23 U/L (ref 13–56)
ANION GAP SERPL CALCULATED.3IONS-SCNC: 9 MMOL/L (ref 7–16)
AST SERPL W P-5'-P-CCNC: 16 U/L (ref 15–37)
ATYPICAL LYMPHOCYTES: ABNORMAL
BASOPHILS # BLD AUTO: 0.09 K/UL (ref 0–0.2)
BASOPHILS NFR BLD AUTO: 0.8 % (ref 0–1)
BILIRUB SERPL-MCNC: 0.4 MG/DL (ref ?–1.2)
BUN SERPL-MCNC: 10 MG/DL (ref 7–18)
BUN/CREAT SERPL: 14 (ref 6–20)
CALCIUM SERPL-MCNC: 9.5 MG/DL (ref 8.5–10.1)
CHLORIDE SERPL-SCNC: 107 MMOL/L (ref 98–107)
CHOLEST SERPL-MCNC: 173 MG/DL (ref 0–200)
CHOLEST/HDLC SERPL: 3.5 {RATIO}
CO2 SERPL-SCNC: 28 MMOL/L (ref 21–32)
CREAT SERPL-MCNC: 0.73 MG/DL (ref 0.55–1.02)
CRENATED CELLS: ABNORMAL
DIFFERENTIAL METHOD BLD: ABNORMAL
EGFR (NO RACE VARIABLE) (RUSH/TITUS): 106 ML/MIN/1.73M2
EOSINOPHIL # BLD AUTO: 0.24 K/UL (ref 0–0.5)
EOSINOPHIL NFR BLD AUTO: 2 % (ref 1–4)
EOSINOPHIL NFR BLD MANUAL: 5 % (ref 1–4)
ERYTHROCYTE [DISTWIDTH] IN BLOOD BY AUTOMATED COUNT: 13.7 % (ref 11.5–14.5)
GLOBULIN SER-MCNC: 3.2 G/DL (ref 2–4)
GLUCOSE SERPL-MCNC: 77 MG/DL (ref 74–106)
HCT VFR BLD AUTO: 45.6 % (ref 38–47)
HDLC SERPL-MCNC: 49 MG/DL (ref 40–60)
HGB BLD-MCNC: 15.2 G/DL (ref 12–16)
IMM GRANULOCYTES # BLD AUTO: 0.07 K/UL (ref 0–0.04)
IMM GRANULOCYTES NFR BLD: 0.6 % (ref 0–0.4)
LDLC SERPL CALC-MCNC: 61 MG/DL
LDLC/HDLC SERPL: 1.2 {RATIO}
LYMPHOCYTES # BLD AUTO: 4.37 K/UL (ref 1–4.8)
LYMPHOCYTES NFR BLD AUTO: 37.1 % (ref 27–41)
LYMPHOCYTES NFR BLD MANUAL: 34 % (ref 27–41)
MCH RBC QN AUTO: 31.4 PG (ref 27–31)
MCHC RBC AUTO-ENTMCNC: 33.3 G/DL (ref 32–36)
MCV RBC AUTO: 94.2 FL (ref 80–96)
MONOCYTES # BLD AUTO: 0.9 K/UL (ref 0–0.8)
MONOCYTES NFR BLD AUTO: 7.6 % (ref 2–6)
MONOCYTES NFR BLD MANUAL: 1 % (ref 2–6)
MPC BLD CALC-MCNC: 9.6 FL (ref 9.4–12.4)
NEUTROPHILS # BLD AUTO: 6.12 K/UL (ref 1.8–7.7)
NEUTROPHILS NFR BLD AUTO: 51.9 % (ref 53–65)
NEUTS BAND NFR BLD MANUAL: 4 % (ref 1–5)
NEUTS SEG NFR BLD MANUAL: 56 % (ref 50–62)
NONHDLC SERPL-MCNC: 124 MG/DL
NRBC # BLD AUTO: 0 X10E3/UL
NRBC BLD MANUAL-RTO: 1 /100 WBC
NRBC, AUTO (.00): 0 %
PLATELET # BLD AUTO: 384 K/UL (ref 150–400)
PLATELET MORPHOLOGY: ABNORMAL
POTASSIUM SERPL-SCNC: 4 MMOL/L (ref 3.5–5.1)
PROT SERPL-MCNC: 7.4 G/DL (ref 6.4–8.2)
RBC # BLD AUTO: 4.84 M/UL (ref 4.2–5.4)
SODIUM SERPL-SCNC: 140 MMOL/L (ref 136–145)
TRIGL SERPL-MCNC: 314 MG/DL (ref 35–150)
VLDLC SERPL-MCNC: 63 MG/DL
WBC # BLD AUTO: 11.79 K/UL (ref 4.5–11)

## 2024-03-04 PROCEDURE — 99214 OFFICE O/P EST MOD 30 MIN: CPT | Mod: ,,, | Performed by: NURSE PRACTITIONER

## 2024-03-04 PROCEDURE — 85025 COMPLETE CBC W/AUTO DIFF WBC: CPT | Mod: ,,, | Performed by: CLINICAL MEDICAL LABORATORY

## 2024-03-04 PROCEDURE — 83036 HEMOGLOBIN GLYCOSYLATED A1C: CPT | Mod: ,,, | Performed by: CLINICAL MEDICAL LABORATORY

## 2024-03-04 PROCEDURE — 80053 COMPREHEN METABOLIC PANEL: CPT | Mod: ,,, | Performed by: CLINICAL MEDICAL LABORATORY

## 2024-03-04 PROCEDURE — 80061 LIPID PANEL: CPT | Mod: ,,, | Performed by: CLINICAL MEDICAL LABORATORY

## 2024-03-04 RX ORDER — BUSPIRONE HYDROCHLORIDE 7.5 MG/1
7.5 TABLET ORAL 3 TIMES DAILY
Qty: 90 TABLET | Refills: 5 | Status: SHIPPED | OUTPATIENT
Start: 2024-03-04 | End: 2025-03-04

## 2024-03-04 RX ORDER — KETOROLAC TROMETHAMINE 10 MG/1
10 TABLET, FILM COATED ORAL EVERY 6 HOURS PRN
Qty: 20 TABLET | Refills: 0 | Status: SHIPPED | OUTPATIENT
Start: 2024-03-04 | End: 2024-03-09

## 2024-03-04 RX ORDER — ROSUVASTATIN CALCIUM 20 MG/1
20 TABLET, COATED ORAL DAILY
Qty: 90 TABLET | Refills: 3 | Status: SHIPPED | OUTPATIENT
Start: 2024-03-04 | End: 2025-03-04

## 2024-03-04 RX ORDER — HYDROXYZINE PAMOATE 50 MG/1
CAPSULE ORAL
Qty: 90 CAPSULE | Refills: 5 | Status: CANCELLED | OUTPATIENT
Start: 2024-03-04

## 2024-03-04 RX ORDER — ESCITALOPRAM OXALATE 20 MG/1
40 TABLET ORAL DAILY
Qty: 60 TABLET | Refills: 5 | Status: SHIPPED | OUTPATIENT
Start: 2024-03-04

## 2024-03-04 RX ORDER — SUMATRIPTAN SUCCINATE 100 MG/1
TABLET ORAL
Qty: 20 TABLET | Refills: 1 | Status: SHIPPED | OUTPATIENT
Start: 2024-03-04

## 2024-03-04 RX ORDER — SUMATRIPTAN 50 MG/1
TABLET, FILM COATED ORAL
Qty: 9 TABLET | Refills: 5 | Status: CANCELLED | OUTPATIENT
Start: 2024-03-04

## 2024-03-04 RX ORDER — TRAZODONE HYDROCHLORIDE 100 MG/1
100 TABLET ORAL NIGHTLY
Qty: 30 TABLET | Refills: 5 | Status: SHIPPED | OUTPATIENT
Start: 2024-03-04

## 2024-03-04 NOTE — ASSESSMENT & PLAN NOTE
Increase Imitrex to 100mg PRN migraines. Avoid known triggers.  Follow up in 3 months or as needed.

## 2024-03-04 NOTE — PROGRESS NOTES
Health Maintenance Due   Topic Date Due    Hepatitis C Screening  Patient Declined    COVID-19 Vaccine (1) Patient Declined    Pneumococcal Vaccines (Age 0-64) (1 of 2 - PCV) Patient Aged Out    HIV Screening  Patient Declined    TETANUS VACCINE  Patient Declined     Mammogram  Pt will check with insurance and call back    Hemoglobin A1c (Diabetic Prevention Screening)  Ordered

## 2024-03-04 NOTE — PROGRESS NOTES
Kimi Travis NP   [place] [address] [facility] [address]  [place]  [address]      PATIENT NAME: Jaleesa Moreira  : 1980  DATE: 3/4/24  MRN: [id number]      Billing Provider: Kimi Travis NP  Level of Service: CO OFFICE/OUTPT VISIT, EST, LEVL IV, 30-39 MIN  Patient PCP Information       Provider PCP Type    Kimi Travis NP General            Reason for Visit / Chief Complaint: Follow-up (Patient is a 43 year old female presenting for a routine follow-up on Migraines, Anxiety, Insomnia, and HLD with labs and medication refills. ), Anxiety (Reports she doesn't feel like the Vistaril is helping as well as it used to. ), Migraine, Hyperlipidemia, and Insomnia         History of Present Illness / Problem Focused Workflow     Patient is a 43 year old female presenting for a routine follow-up on Migraines, Anxiety, Insomnia, and HLD with labs and medication refills.   Anxiety: Reports she doesn't feel like the Vistaril is helping as well as it used to.   Migraine  Hyperlipidemia  Insomnia            Review of Systems     @Review of Systems   Constitutional:  Negative for activity change, appetite change, fatigue and fever.   HENT:  Negative for nasal congestion, ear pain, rhinorrhea, sinus pressure/congestion and sore throat.    Eyes:  Negative for pain, redness, visual disturbance and eye dryness.   Respiratory:  Negative for cough and shortness of breath.    Cardiovascular:  Negative for chest pain and leg swelling.   Gastrointestinal:  Negative for abdominal distention, abdominal pain, constipation and diarrhea.   Endocrine: Negative for cold intolerance, heat intolerance and polyuria.   Genitourinary:  Negative for bladder incontinence, dysuria, frequency and urgency.   Musculoskeletal:  Negative for arthralgias, gait problem and myalgias.   Integumentary:  Negative for color change, rash and wound.   Allergic/Immunologic: Negative for environmental allergies and food allergies.  Patient : Mar Jesus Age: 25 year old Sex: female   MRN: [id number] Encounter Date: 7/6/2022      History     Chief Complaint   Patient presents with   • Abdominal Pain   • Chest Pain Adult     HPI  7/6/2022  9:55 AM Mar Jesus is a 25 year old female who presents to the ED for evaluation of abd pain and back pain that began last night. Per pt, her pain has since subsided while she was in the waiting room and she now feels hungry. She reports she is \"generally feeling unwell\" for the last week. Pt c/o generalized body aches, fatigue, and sore throat. She notes she had a colonoscopy a week ago but is unsure if that is related to her sx. Pt thinks her sore throat might be related to GERD. She mentions the left side of her neck is swollen and thinks it might be her lymph node. Pt states \"I think my WBC count might be high.\" She reports she had a sinus infection 2 weeks ago. There are no further complaints or modifying factors at this time.    PCP: Terri Zelaya MD      Allergies   Allergen Reactions   • Kiwi   (Food Or Med) ANAPHYLAXIS     Tongue itching and SOB   • Lactase   (Food Or Med) GI UPSET and DIARRHEA   • Metoclopramide Other (See Comments)     Agitation   • Pollen Other (See Comments)     Sinus issues        Current Discharge Medication List      Prior to Admission Medications    Details   ondansetron (Zofran ODT) 4 MG disintegrating tablet Place 1 tablet onto the tongue every 8 hours as needed for Nausea.  Qty: 15 tablet, Refills: 0      famotidine (PEPCID) 20 MG tablet Take 1 tablet by mouth in the morning and 1 tablet before bedtime. Indications: Gastroesophageal Reflux Disease.  Qty: 180 tablet, Refills: 0      Multiple Vitamins-Minerals (MULTIPLE VITAMINS/WOMENS PO) Take by mouth daily.      ZINC SULFATE PO Take by mouth daily as needed.      azithromycin (Zithromax) 500 MG tablet Take 1 tablet by mouth daily.  Qty: 5 tablet, Refills: 0      escitalopram (LEXAPRO) 10 MG tablet    Neurological:  Negative for dizziness, weakness, light-headedness and headaches.   Psychiatric/Behavioral:  Negative for behavioral problems and sleep disturbance. The patient is nervous/anxious.        Medical / Social / Family History     Past Medical History:   Diagnosis Date    Anxiety     Hyperlipidemia     Insomnia     Migraines        Past Surgical History:   Procedure Laterality Date    HYSTERECTOMY  09/2014    still has right ovary and cervix    OOPHORECTOMY Left 2015    TONSILLECTOMY  2015    WISDOM TOOTH EXTRACTION         Medications and Allergies     Medications  Outpatient Medications Marked as Taking for the 3/4/24 encounter (Office Visit) with Kimi Travis NP   Medication Sig Dispense Refill    mupirocin (BACTROBAN) 2 % ointment Apply topically 2 (two) times daily. 30 g 0    [DISCONTINUED] EScitalopram oxalate (LEXAPRO) 20 MG tablet TAKE 2 TABLETS BY MOUTH DAILY 60 tablet 5    [DISCONTINUED] hydrOXYzine pamoate (VISTARIL) 50 MG Cap Take 2 capsules every 4-6 hours as needed for anxiety. 90 capsule 5    [DISCONTINUED] rosuvastatin (CRESTOR) 20 MG tablet Take 1 tablet (20 mg total) by mouth once daily. 90 tablet 3    [DISCONTINUED] sumatriptan (IMITREX) 50 MG tablet TAKE 1 TABLET BY MOUTH at onset OF HEADACHE, REPEAT EVERY 2 hours UNTIL RELIEF, max 4 TABLETS in 24 hours 9 tablet 5    [DISCONTINUED] traZODone (DESYREL) 100 MG tablet Take 1 tablet (100 mg total) by mouth nightly. 30 tablet 5       Allergies  Review of patient's allergies indicates:  No Known Allergies    Physical Examination     Vitals:    03/04/24 1534   BP: 132/84   Pulse:    Resp:    Temp:      Physical Exam  Vitals and nursing note reviewed.   HENT:      Head: Normocephalic.      Right Ear: Tympanic membrane normal.      Left Ear: Tympanic membrane normal.      Nose: Nose normal.      Mouth/Throat:      Mouth: Mucous membranes are moist.      Pharynx: Oropharynx is clear. No posterior oropharyngeal erythema.   Eyes:       Take 1 tablet by mouth daily.  Qty: 90 tablet, Refills: 3      meclizine (ANTIVERT) 25 MG tablet Take 1 tablet by mouth every 6 hours as needed for Dizziness.  Qty: 60 tablet, Refills: 1      calcium carbonate, antacid, 400 MG Chew Tab Chew 400-1,200 mg by mouth daily as needed (reflux). Indications: Acid Indigestion, Stomach Upset, Heartburn, Sour Stomach  Qty: 90 tablet, Refills: 1      polyethylene glycol (MIRALAX) 17 GM/SCOOP powder Take 17 g by mouth daily as needed (constipation). Stir and dissolve powder in any 4 to 8 ounces of beverage, then drink.  Qty: 238 g, Refills: 0      LORazepam (Ativan) 0.5 MG tablet Take 1 tablet by mouth every 8 hours as needed for Anxiety.  Qty: 10 tablet, Refills: 0             Past Medical History:   Diagnosis Date   • Anxiety    • Depression 6/3/2020   • Eating disorder    • Irregular menses 3/16/2022   • Scoliosis     curvature was 45 degrees, did not have surgery, had a brace for a bit       Past Surgical History:   Procedure Laterality Date   • NO PAST SURGERIES         Family History   Problem Relation Age of Onset   • Diabetes Mother         Diabetes type 2   • Crohn's Disease Father    • Cancer, Colon Maternal Grandmother    • Cancer, Lung Maternal Grandfather    • Patient is unaware of any medical problems Paternal Grandmother    • Cancer Paternal Grandfather         brain       Social History     Tobacco Use   • Smoking status: Former Smoker     Quit date: 2018     Years since quittin.4   • Smokeless tobacco: Former User     Types: Chew     Quit date: 2019   Vaping Use   • Vaping Use: Former   Substance Use Topics   • Alcohol use: Not Currently   • Drug use: Not Currently       E-cigarette/Vaping   • E-Cigarette/Vaping Use Former User      E-Cigarette/Vaping Substances & Devices       Review of Systems   Constitutional: Positive for fatigue. Negative for fever.   HENT: Positive for sore throat. Negative for trouble swallowing.    Respiratory: Negative for  Conjunctiva/sclera: Conjunctivae normal.   Cardiovascular:      Rate and Rhythm: Normal rate and regular rhythm.      Pulses: Normal pulses.      Heart sounds: Normal heart sounds.   Pulmonary:      Effort: Pulmonary effort is normal.      Breath sounds: Normal breath sounds.   Abdominal:      General: Abdomen is flat. Bowel sounds are normal. There is no distension.      Palpations: Abdomen is soft.   Musculoskeletal:         General: No swelling or tenderness. Normal range of motion.      Cervical back: Normal range of motion.      Right lower leg: No edema.      Left lower leg: No edema.   Skin:     General: Skin is warm and dry.      Capillary Refill: Capillary refill takes less than 2 seconds.   Neurological:      Mental Status: She is alert. Mental status is at baseline.   Psychiatric:         Mood and Affect: Mood normal.         Behavior: Behavior normal.               Lab Results   Component Value Date    WBC 14.07 (H) 01/09/2023    HGB 15.1 01/09/2023    HCT 46.0 01/09/2023    MCV 95.0 01/09/2023     (H) 01/09/2023        CMP  Sodium   Date Value Ref Range Status   01/09/2023 140 136 - 145 mmol/L Final     Potassium   Date Value Ref Range Status   01/09/2023 4.2 3.5 - 5.1 mmol/L Final     Chloride   Date Value Ref Range Status   01/09/2023 107 98 - 107 mmol/L Final     CO2   Date Value Ref Range Status   01/09/2023 28 21 - 32 mmol/L Final     Glucose   Date Value Ref Range Status   01/09/2023 60 (L) 74 - 106 mg/dL Final     BUN   Date Value Ref Range Status   01/09/2023 6 (L) 7 - 18 mg/dL Final     Creatinine   Date Value Ref Range Status   01/09/2023 0.75 0.55 - 1.02 mg/dL Final     Calcium   Date Value Ref Range Status   01/09/2023 9.6 8.5 - 10.1 mg/dL Final     Total Protein   Date Value Ref Range Status   01/09/2023 7.6 6.4 - 8.2 g/dL Final     Albumin   Date Value Ref Range Status   01/09/2023 3.8 3.5 - 5.0 g/dL Final     Bilirubin, Total   Date Value Ref Range Status   01/09/2023 0.5 >0.0 -  cough and shortness of breath.    Cardiovascular: Negative for chest pain.   Gastrointestinal: Positive for abdominal pain (resolved). Negative for diarrhea and vomiting.   Genitourinary: Negative for dysuria.   Musculoskeletal: Positive for arthralgias (generalized) and back pain (resolved).   Skin: Negative for rash.   Neurological: Negative for syncope.   Psychiatric/Behavioral: Negative for behavioral problems.     Physical Exam     ED Triage Vitals [07/06/22 0237]   ED Triage Vitals Group      Temp 97.2 °F (36.2 °C)      Heart Rate 90      Resp 18      /67      SpO2 99 %      EtCO2 mmHg       Height       Weight       Weight Scale Used       BMI (Calculated)       IBW/kg (Calculated)        Physical Exam  Vitals and nursing note reviewed.   Constitutional:       General: She is not in acute distress.     Appearance: She is well-developed. She is not diaphoretic.   HENT:      Head: Normocephalic and atraumatic.      Nose: Nose normal.      Mouth/Throat:      Tonsils: No tonsillar exudate.      Comments: No tonsillar swelling  Eyes:      Conjunctiva/sclera: Conjunctivae normal.      Pupils: Pupils are equal, round, and reactive to light.   Cardiovascular:      Rate and Rhythm: Normal rate and regular rhythm.      Heart sounds: Normal heart sounds. No murmur heard.  Pulmonary:      Effort: Pulmonary effort is normal. No respiratory distress.      Breath sounds: Normal breath sounds.   Abdominal:      General: There is no distension.      Palpations: Abdomen is soft.      Tenderness: There is no abdominal tenderness.   Musculoskeletal:         General: No deformity. Normal range of motion.      Cervical back: Normal range of motion and neck supple. No rigidity.   Lymphadenopathy:      Cervical: No cervical adenopathy.   Skin:     General: Skin is warm and dry.      Findings: No rash.   Neurological:      Mental Status: She is alert and oriented to person, place, and time.      GCS: GCS eye subscore is 4. GCS  1.2 mg/dL Final     Alk Phos   Date Value Ref Range Status   01/09/2023 103 (H) 37 - 98 U/L Final     AST   Date Value Ref Range Status   01/09/2023 29 15 - 37 U/L Final     ALT   Date Value Ref Range Status   01/09/2023 38 13 - 56 U/L Final     Anion Gap   Date Value Ref Range Status   01/09/2023 9 7 - 16 mmol/L Final     eGFR   Date Value Ref Range Status   01/09/2023 103 >=60 mL/min/1.73m² Final     Procedures   Assessment and Plan (including Health Maintenance)   :    Plan:     Problem List Items Addressed This Visit          Neuro    Migraines    Current Assessment & Plan     Increase Imitrex to 100mg PRN migraines. Avoid known triggers.  Follow up in 3 months or as needed.          Relevant Medications    sumatriptan (IMITREX) 100 MG tablet       Psychiatric    Anxiety    Current Assessment & Plan     Continue Lexapro, will add Buspar TID as well and DC vistaril as she feels this is no longer helping. Continue Trazodone at bedtime. Follow up in 3 months or as needed.          Relevant Medications    busPIRone (BUSPAR) 7.5 MG tablet       Cardiac/Vascular    Mixed hyperlipidemia    Overview     Fasting lipids ordered, continue current medication and LFLC diet. LDL goal < 70         Current Assessment & Plan     Lipid panel obtained at today's visit. Goal LDL is less than 70. Continue current meds and low fat/low cholesterol diet with increased exercise as tolerated. Will follow up with labs. Patient to follow up in 3 months or as needed.                Relevant Medications    rosuvastatin (CRESTOR) 20 MG tablet    Other Relevant Orders    CBC Auto Differential    Comprehensive Metabolic Panel    Lipid Panel     Other Visit Diagnoses       Other long term (current) drug therapy    -  Primary    Relevant Orders    CBC Auto Differential    Comprehensive Metabolic Panel    Depression, unspecified depression type        Will check routine labs and continue current medications.     Relevant Medications     verbal subscore is 5. GCS motor subscore is 6.   Psychiatric:         Judgment: Judgment normal.        ED Course     Procedures    Lab Results     Results for orders placed or performed during the hospital encounter of 07/06/22   Comprehensive Metabolic Panel   Result Value Ref Range    Fasting Status      Sodium 138 135 - 145 mmol/L    Potassium 4.3 3.4 - 5.1 mmol/L    Chloride 106 97 - 110 mmol/L    Carbon Dioxide 28 21 - 32 mmol/L    Anion Gap 8 7 - 19 mmol/L    Glucose 88 70 - 99 mg/dL    BUN 9 6 - 20 mg/dL    Creatinine 0.63 0.51 - 0.95 mg/dL    Glomerular Filtration Rate >90 >=60    BUN/ Creatinine Ratio 14 7 - 25    Calcium 9.5 8.4 - 10.2 mg/dL    Bilirubin, Total 0.4 0.2 - 1.0 mg/dL    GOT/AST 12 <=37 Units/L    GPT/ALT 15 <64 Units/L    Alkaline Phosphatase 85 45 - 117 Units/L    Albumin 3.8 3.6 - 5.1 g/dL    Protein, Total 7.2 6.4 - 8.2 g/dL    Globulin 3.4 2.0 - 4.0 g/dL    A/G Ratio 1.1 1.0 - 2.4   Lipase   Result Value Ref Range    Lipase 158 73 - 393 Units/L   CBC with Automated Differential (performable only)   Result Value Ref Range    WBC 11.1 (H) 4.2 - 11.0 K/mcL    RBC 4.22 4.00 - 5.20 mil/mcL    HGB 13.2 12.0 - 15.5 g/dL    HCT 40.2 36.0 - 46.5 %    MCV 95.3 78.0 - 100.0 fl    MCH 31.3 26.0 - 34.0 pg    MCHC 32.8 32.0 - 36.5 g/dL    RDW-CV 13.4 11.0 - 15.0 %    RDW-SD 46.7 39.0 - 50.0 fL     140 - 450 K/mcL    NRBC 0 <=0 /100 WBC    Neutrophil, Percent 77 %    Lymphocytes, Percent 15 %    Mono, Percent 7 %    Eosinophils, Percent 0 %    Basophils, Percent 1 %    Immature Granulocytes 0 %    Absolute Neutrophils 8.6 (H) 1.8 - 7.7 K/mcL    Absolute Lymphocytes 1.7 1.0 - 4.8 K/mcL    Absolute Monocytes 0.8 0.3 - 0.9 K/mcL    Absolute Eosinophils  0.0 0.0 - 0.5 K/mcL    Absolute Basophils 0.1 0.0 - 0.3 K/mcL    Absolute Immmature Granulocytes 0.0 0.0 - 0.2 K/mcL   Rapid strep A POC   Result Value Ref Range    GRP A STREP Negative Negative    Internal Procedural Controls Acceptable Yes      EScitalopram oxalate (LEXAPRO) 20 MG tablet    Insomnia, unspecified type        Relevant Medications    traZODone (DESYREL) 100 MG tablet    Encounter for screening for diabetes mellitus        Relevant Orders    Hemoglobin A1C            The patient has no Health Maintenance topics of status Not Due    No future appointments.     Health Maintenance Due   Topic Date Due    Hepatitis C Screening  Never done    COVID-19 Vaccine (1) Never done    Pneumococcal Vaccines (Age 0-64) (1 of 2 - PCV) Never done    HIV Screening  Never done    TETANUS VACCINE  Never done    Mammogram  Never done    Hemoglobin A1c (Diabetic Prevention Screening)  Never done          Signature:  Kmii Travis NP  [facility]  [address] [address]  [address]    Date of encounter: 3/4/24     TEST LOT NUMBER [id number]     TEST LOT EXPIRATION DATE 2023-09-30    ISTAT BETA HCG - POINT OF CARE   Result Value Ref Range    ISTAT BETA HCG - POINT OF CARE <5.0 <5.0 IU/L   TROPONIN I  POINT OF CARE   Result Value Ref Range    TROPONIN I - POINT OF CARE     TROPONIN I  POINT OF CARE   Result Value Ref Range    TROPONIN I - POINT OF CARE <0.10 <0.10 ng/mL       EKG Results     Results for orders placed or performed during the hospital encounter of 07/06/22   ECG   Result Value Ref Range    Ventricular Rate EKG/Min (BPM) 86     Atrial Rate (BPM) 86     LA-Interval (MSEC) 146     QRS-Interval (MSEC) 96     QT-Interval (MSEC) 440     QTc 526     P Axis (Degrees) 68     R Axis (Degrees) 86     T Axis (Degrees) 49     REPORT TEXT       Normal sinus rhythm  T wave abnormality, consider inferior ischemia  Prolonged QT  Abnormal ECG  When compared with ECG of  06-JUL-2022 02:41,  Inverted T waves have replaced nonspecific T wave abnormality in  Anterior leads  QT has shortened  Confirmed by DIAMOND HERNÁNDEZ, NISSA ALEJANDRA ([id number]) on 7/6/2022 10:01:00 AM       Radiology Results     Imaging Results          XR CHEST AP OR PA - PORTABLE (Final result)  Result time 07/06/22 07:18:02    Final result                 Impression:    IMPRESSION:      No acute cardiopulmonary findings.                 Narrative:    XR CHEST AP OR PA    HISTORY: Chest and abdominal pain.    COMPARISON: 5/11/2022.    TECHNIQUE: A single, AP, upright view of the chest is submitted for  evaluation.    FINDINGS:    The cardiomediastinal silhouette and pulmonary vasculature are normal. No  focal consolidation. No pleural effusion or pneumothorax. S-shaped  curvature of the thoracal lumbar spine.                                ED Medication Orders (From admission, onward)    None               MDM  Vitals  Vitals:    07/06/22 0237 07/06/22 1108 07/06/22 1109   BP: 118/67 107/67 119/73   BP Location: LUE - Left upper extremity RUE - Right upper extremity    Patient  Position: Sitting Sitting    Pulse: 90 74 76   Resp: 18 16 16   Temp: 97.2 °F (36.2 °C)     TempSrc: Temporal     SpO2: 99% 99% 99%   LMP: 06/13/2022       ED Course  10:00 AM Initial Impression: I evaluated the pt at bedside and performed the physical exam. Pt's presentation appears concerning for viral syndrome. Plan will be for lab work, CXR, and COVID-19 panel.    11:10 AM I updated the pt on her lab results. We discussed the plan for the pt to return home. She was encouraged to follow-up with her PCP in 2-3 days, return to ED with any new or worsening symptoms. She will be contacted with positive COVID result within two days.    MDM  Critical Care    No Critical Care      Clinical Impression and Diagnosis  11:17 AM       ED Diagnoses     Diagnosis Comment Associated Orders       Final diagnoses    RUQ abdominal pain -- --    Sore throat -- --          The patient was provided with a recommendation to follow up with a primary care provider and obtain reassessment of his/her blood pressure within three months.    Follow Up:  Terri Zelaya MD  [address]  [address]  [facility] [address]  [phone]                Summary of your Discharge Medications      You have not been prescribed any medications.         Pt is discharged to home/self care in stable condition.       I have reviewed the information recorded by the scribe for accuracy and agree with its contents.    ____________________________________________________________________    Rachael Orlando acting as a scribe for Debbie Dhillon PA-C.    Debbie Dhillon PA-C  Dictation # [id number]  Scribe: Rachael Orlando    Attending Physician: Dr. Brice Bowie  Dictation # [id number]     Debbie Dhillon PA-C  07/06/22 3153

## 2024-03-04 NOTE — ASSESSMENT & PLAN NOTE
Lipid panel obtained at today's visit. Goal LDL is less than 70. Continue current meds and low fat/low cholesterol diet with increased exercise as tolerated. Will follow up with labs. Patient to follow up in 3 months or as needed.

## 2024-03-04 NOTE — ASSESSMENT & PLAN NOTE
Continue Lexapro, will add Buspar TID as well and DC vistaril as she feels this is no longer helping. Continue Trazodone at bedtime. Follow up in 3 months or as needed.

## 2024-03-05 LAB
EST. AVERAGE GLUCOSE BLD GHB EST-MCNC: 120 MG/DL
HBA1C MFR BLD HPLC: 5.8 % (ref 4.5–6.6)

## 2024-03-05 NOTE — PROGRESS NOTES
Labs reviewed: WBC was slightly elevated but this could be due to any type of inflammation or infection. (Patient did report occasional sinus pressure). Triglycerides were elevated- however, I do not think she was fasting when labs were drawn as it was afternoon. Follow low fat/low cholesterol diet, increase exercise, and continue rosuvastatin. A1C was in pre diabetic range. Decrease carbs and concentrated sugars. Follow up in 6 months or as needed.

## 2024-03-08 RX ORDER — SULFAMETHOXAZOLE AND TRIMETHOPRIM 800; 160 MG/1; MG/1
1 TABLET ORAL 2 TIMES DAILY
Qty: 14 TABLET | Refills: 0 | Status: SHIPPED | OUTPATIENT
Start: 2024-03-08

## 2024-07-18 ENCOUNTER — OFFICE VISIT (OUTPATIENT)
Dept: FAMILY MEDICINE | Facility: CLINIC | Age: 44
End: 2024-07-18

## 2024-07-18 VITALS
BODY MASS INDEX: 30.55 KG/M2 | TEMPERATURE: 98 F | DIASTOLIC BLOOD PRESSURE: 90 MMHG | HEART RATE: 85 BPM | RESPIRATION RATE: 18 BRPM | OXYGEN SATURATION: 98 % | HEIGHT: 57 IN | WEIGHT: 141.63 LBS | SYSTOLIC BLOOD PRESSURE: 132 MMHG

## 2024-07-18 DIAGNOSIS — S50.361A TICK BITE OF RIGHT ELBOW, INITIAL ENCOUNTER: Primary | ICD-10-CM
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
## 2024-07-18 PROCEDURE — 99213 OFFICE O/P EST LOW 20 MIN: CPT | Mod: 25,,,

## 2024-07-18 PROCEDURE — 96372 THER/PROPH/DIAG INJ SC/IM: CPT | Mod: ,,,

## 2024-07-18 RX ORDER — LINCOMYCIN HYDROCHLORIDE 300 MG/ML
600 INJECTION, SOLUTION INTRAMUSCULAR; INTRAVENOUS; SUBCONJUNCTIVAL
Status: COMPLETED | OUTPATIENT
Start: 2024-07-18 | End: 2024-07-18

## 2024-07-18 RX ORDER — MUPIROCIN 20 MG/G
OINTMENT TOPICAL 3 TIMES DAILY
Qty: 22 G | Refills: 0 | Status: SHIPPED | OUTPATIENT
Start: 2024-07-18

## 2024-07-18 RX ORDER — DOXYCYCLINE 100 MG/1
100 CAPSULE ORAL 2 TIMES DAILY
Qty: 20 CAPSULE | Refills: 0 | Status: SHIPPED | OUTPATIENT
Start: 2024-07-18 | End: 2024-07-28

## 2024-07-18 RX ORDER — HYDROXYZINE HYDROCHLORIDE 25 MG/1
25 TABLET, FILM COATED ORAL 3 TIMES DAILY
Qty: 30 TABLET | Refills: 0 | Status: SHIPPED | OUTPATIENT
Start: 2024-07-18

## 2024-07-18 RX ADMIN — LINCOMYCIN HYDROCHLORIDE 600 MG: 300 INJECTION, SOLUTION INTRAMUSCULAR; INTRAVENOUS; SUBCONJUNCTIVAL at 10:07

## 2024-07-18 NOTE — PROGRESS NOTES
DERRICK DON, MADIHA   [facility]  [address]  [place]  [address]      PATIENT NAME: Jaleesa Us  : 1980  DATE: 24  MRN: [id number]        Reason for Visit / Chief Complaint: Tick Removal (Patient is Jaleesa us a 43 y/o female that is here for a tick bite on her right elbow. Patient states it happened on Tuesday and she got the head of the tick out and cleaned the area with dial soap and alcohol and took some benadryl. Patient does have redness and swelling around the area and the redness and swelling has spread more over the last day. Patient states it itches and drains a clear/ white liquid.)         History of Present Illness / Problem Focused Workflow     43 y/o female presents to clinic with complaints of tick bite to right elbow. States she found the tick Tuesday and pulled it off. Now she has a bite area that is reddened, mildly swollen and pruritic. States the redness is spreading. Denies any other symptoms.       Review of Systems     @Review of Systems   Constitutional:  Negative for chills, fatigue and fever.   HENT:  Negative for nasal congestion, ear discharge, ear pain, rhinorrhea, sinus pressure/congestion and sore throat.    Respiratory:  Negative for cough, chest tightness, shortness of breath, wheezing and stridor.    Cardiovascular:  Negative for palpitations and claudication.   Gastrointestinal:  Negative for abdominal pain, constipation, diarrhea, nausea, vomiting and reflux.   Genitourinary:  Negative for dysuria, flank pain, frequency, hematuria and urgency.   Musculoskeletal:  Negative for myalgias.   Integumentary:  Positive for wound (tick bite left elbow).   Neurological:  Negative for dizziness, weakness, light-headedness and headaches.   Psychiatric/Behavioral:  Negative for suicidal ideas.        Medical / Social / Family History     Past Medical History:   Diagnosis Date    Anxiety     Hyperlipidemia     Insomnia     Migraines        Past  Surgical History:   Procedure Laterality Date    HYSTERECTOMY  09/2014    still has right ovary and cervix    OOPHORECTOMY Left 2015    TONSILLECTOMY  2015    WISDOM TOOTH EXTRACTION             Medications and Allergies     Medications  Outpatient Medications Marked as Taking for the 7/18/24 encounter (Office Visit) with Tiburcio Nance FNP   Medication Sig Dispense Refill    busPIRone (BUSPAR) 7.5 MG tablet Take 1 tablet (7.5 mg total) by mouth 3 (three) times daily. 90 tablet 5    EScitalopram oxalate (LEXAPRO) 20 MG tablet Take 2 tablets (40 mg total) by mouth once daily. 60 tablet 5    rosuvastatin (CRESTOR) 20 MG tablet Take 1 tablet (20 mg total) by mouth once daily. 90 tablet 3    sumatriptan (IMITREX) 100 MG tablet Take at onset of headache, may repeat in 2 hours if unrelieved. 20 tablet 1    traZODone (DESYREL) 100 MG tablet Take 1 tablet (100 mg total) by mouth nightly. 30 tablet 5    [DISCONTINUED] mupirocin (BACTROBAN) 2 % ointment Apply topically 2 (two) times daily. 30 g 0     Current Facility-Administered Medications for the 7/18/24 encounter (Office Visit) with Tiburcio Nance FNP   Medication Dose Route Frequency Provider Last Rate Last Admin    [COMPLETED] lincomycin injection 600 mg  600 mg Intramuscular 1 time in Clinic/HOD Tiburcio Nance FNP   600 mg at 07/18/24 1012       Allergies  Review of patient's allergies indicates:  No Known Allergies    Physical Examination     Vitals:    07/18/24 0923   BP: (!) 132/90   Pulse: 85   Resp: 18   Temp: 98.3 °F (36.8 °C)     Physical Exam  Vitals and nursing note reviewed.   Constitutional:       General: She is awake.      Appearance: Normal appearance.   HENT:      Head: Normocephalic.      Right Ear: Tympanic membrane, ear canal and external ear normal.      Left Ear: Tympanic membrane, ear canal and external ear normal.      Nose: Nose normal.      Mouth/Throat:      Lips: Pink.      Mouth: Mucous membranes are moist.      Pharynx: Oropharynx is  clear. Uvula midline.   Cardiovascular:      Rate and Rhythm: Normal rate and regular rhythm.      Heart sounds: Normal heart sounds, S1 normal and S2 normal.   Pulmonary:      Effort: Pulmonary effort is normal. No respiratory distress.      Breath sounds: Normal breath sounds. No decreased breath sounds, wheezing, rhonchi or rales.   Abdominal:      General: Bowel sounds are normal.      Palpations: Abdomen is soft.      Tenderness: There is no abdominal tenderness.   Musculoskeletal:      Cervical back: Normal range of motion.   Skin:     General: Skin is warm.      Capillary Refill: Capillary refill takes less than 2 seconds.      Comments: Bite noted to right elbow with mild edema, clear small amount of drainage and approximately 2 cm erythema circled around bite area.    Neurological:      Mental Status: She is alert and oriented to person, place, and time.   Psychiatric:         Thought Content: Thought content does not include homicidal or suicidal ideation. Thought content does not include homicidal or suicidal plan.               Procedures   Assessment and Plan (including Health Maintenance)   :    Plan:     Problem List Items Addressed This Visit          ID    Tick bite of right elbow - Primary    Current Assessment & Plan     Lincomycin IM today. Mupirocin and Doxycycline RX. Medication instructions and education given with understanding voiced. RTC with worsening, new or persistent symptoms with understanding voiced.          Relevant Medications    lincomycin injection 600 mg (Completed)    doxycycline (VIBRAMYCIN) 100 MG Cap    mupirocin (BACTROBAN) 2 % ointment    hydrOXYzine HCL (ATARAX) 25 MG tablet       Health Maintenance Topics with due status: Not Due       Topic Last Completion Date    Influenza Vaccine 03/04/2024    Hemoglobin A1c (Diabetic Prevention Screening) 03/04/2024       No future appointments.     Health Maintenance Due   Topic Date Due    Hepatitis C Screening  Never done     Pneumococcal Vaccines (Age 0-64) (1 of 2 - PCV) Never done    HIV Screening  Never done    TETANUS VACCINE  Never done    Mammogram  Never done    COVID-19 Vaccine (1 - 2023-24 season) Never done        Follow up if symptoms worsen or fail to improve.     Signature:  MADIHA ALVES  [facility]  [address] [place] [address], [place]  [address]    Date of encounter: 7/18/24

## 2024-07-18 NOTE — PROGRESS NOTES
Health Maintenance Due   Topic Date Due    Hepatitis C Screening  Never done    Pneumococcal Vaccines (Age 0-64) (1 of 2 - PCV) Never done    HIV Screening  Never done    TETANUS VACCINE  Never done    Mammogram  Never done    COVID-19 Vaccine (1 - 2023-24 season) Never done     I have discussed care gaps with patient and she wants to defer for now since she is sick today.

## 2024-07-18 NOTE — ASSESSMENT & PLAN NOTE
Lincomycin IM today. Mupirocin and Doxycycline RX. Medication instructions and education given with understanding voiced. RTC with worsening, new or persistent symptoms with understanding voiced.

## 2024-10-18 DIAGNOSIS — G47.00 INSOMNIA, UNSPECIFIED TYPE: ICD-10-CM

## 2024-10-18 RX ORDER — TRAZODONE HYDROCHLORIDE 100 MG/1
100 TABLET ORAL NIGHTLY
Qty: 30 TABLET | Refills: 5 | Status: SHIPPED | OUTPATIENT
Start: 2024-10-18

## 2024-12-17 ENCOUNTER — TELEPHONE (OUTPATIENT)
Dept: FAMILY MEDICINE | Facility: CLINIC | Age: 44
End: 2024-12-17

## 2024-12-17 NOTE — TELEPHONE ENCOUNTER
Attempted to notify patient to schedule office visit. She called clinic 12/16/2024 requesting medication refill. Patient has not had office visit since 03/2024 r/t chronic conditions and lab.

## 2025-02-07 ENCOUNTER — PATIENT OUTREACH (OUTPATIENT)
Facility: HOSPITAL | Age: 45
End: 2025-02-07

## 2025-02-07 NOTE — PROGRESS NOTES
02/07/2025   --Chart accessed for: Care Gaps  Next appointment 2/10/2025 . Appointment notes updated to include: due for mammogram and BP less than 140/90  Health Maintenance Due   Topic Date Due    Hepatitis C Screening  Never done    HIV Screening  Never done    TETANUS VACCINE  Never done    Mammogram  Never done    Pneumococcal Vaccines (Age 0-49) (1 of 2 - PCV) Never done    Influenza Vaccine (1) 09/01/2024    COVID-19 Vaccine (1 - 2024-25 season) Never done

## 2025-02-10 ENCOUNTER — OFFICE VISIT (OUTPATIENT)
Dept: FAMILY MEDICINE | Facility: CLINIC | Age: 45
End: 2025-02-10

## 2025-02-10 VITALS
TEMPERATURE: 98 F | DIASTOLIC BLOOD PRESSURE: 78 MMHG | HEIGHT: 57 IN | RESPIRATION RATE: 18 BRPM | HEART RATE: 110 BPM | BODY MASS INDEX: 30.63 KG/M2 | WEIGHT: 142 LBS | OXYGEN SATURATION: 97 % | SYSTOLIC BLOOD PRESSURE: 120 MMHG

## 2025-02-10 DIAGNOSIS — E78.2 MIXED HYPERLIPIDEMIA: ICD-10-CM

## 2025-02-10 DIAGNOSIS — Z79.899 OTHER LONG TERM (CURRENT) DRUG THERAPY: ICD-10-CM

## 2025-02-10 DIAGNOSIS — F32.A DEPRESSION, UNSPECIFIED DEPRESSION TYPE: ICD-10-CM

## 2025-02-10 DIAGNOSIS — G47.00 INSOMNIA, UNSPECIFIED TYPE: ICD-10-CM

## 2025-02-10 DIAGNOSIS — F41.9 ANXIETY: Primary | ICD-10-CM

## 2025-02-10 LAB
ALBUMIN SERPL BCP-MCNC: 3.7 G/DL (ref 3.5–5)
ALBUMIN/GLOB SERPL: 1.2 {RATIO}
ALP SERPL-CCNC: 96 U/L (ref 40–150)
ALT SERPL W P-5'-P-CCNC: 13 U/L
ANION GAP SERPL CALCULATED.3IONS-SCNC: 15 MMOL/L (ref 7–16)
AST SERPL W P-5'-P-CCNC: 42 U/L (ref 5–34)
BASOPHILS # BLD AUTO: 0.07 K/UL (ref 0–0.2)
BASOPHILS NFR BLD AUTO: 0.6 % (ref 0–1)
BILIRUB SERPL-MCNC: 0.5 MG/DL
BUN SERPL-MCNC: 7 MG/DL (ref 7–19)
BUN/CREAT SERPL: 9 (ref 6–20)
CALCIUM SERPL-MCNC: 9.1 MG/DL (ref 8.4–10.2)
CHLORIDE SERPL-SCNC: 104 MMOL/L (ref 98–107)
CHOLEST SERPL-MCNC: 139 MG/DL
CHOLEST/HDLC SERPL: 2.7 {RATIO}
CO2 SERPL-SCNC: 25 MMOL/L (ref 22–29)
CREAT SERPL-MCNC: 0.78 MG/DL (ref 0.55–1.02)
DIFFERENTIAL METHOD BLD: ABNORMAL
EGFR (NO RACE VARIABLE) (RUSH/TITUS): 97 ML/MIN/1.73M2
EOSINOPHIL # BLD AUTO: 0.27 K/UL (ref 0–0.5)
EOSINOPHIL NFR BLD AUTO: 2.4 % (ref 1–4)
ERYTHROCYTE [DISTWIDTH] IN BLOOD BY AUTOMATED COUNT: 12.8 % (ref 11.5–14.5)
GLOBULIN SER-MCNC: 3.1 G/DL (ref 2–4)
GLUCOSE SERPL-MCNC: 73 MG/DL (ref 74–100)
HCT VFR BLD AUTO: 43.4 % (ref 38–47)
HDLC SERPL-MCNC: 51 MG/DL (ref 35–60)
HGB BLD-MCNC: 14.1 G/DL (ref 12–16)
IMM GRANULOCYTES # BLD AUTO: 0.07 K/UL (ref 0–0.04)
IMM GRANULOCYTES NFR BLD: 0.6 % (ref 0–0.4)
LDLC SERPL CALC-MCNC: 49 MG/DL
LDLC/HDLC SERPL: 1 {RATIO}
LYMPHOCYTES # BLD AUTO: 2.9 K/UL (ref 1–4.8)
LYMPHOCYTES NFR BLD AUTO: 26.2 % (ref 27–41)
MCH RBC QN AUTO: 30.9 PG (ref 27–31)
MCHC RBC AUTO-ENTMCNC: 32.5 G/DL (ref 32–36)
MCV RBC AUTO: 95 FL (ref 80–96)
MONOCYTES # BLD AUTO: 0.71 K/UL (ref 0–0.8)
MONOCYTES NFR BLD AUTO: 6.4 % (ref 2–6)
MPC BLD CALC-MCNC: 9.6 FL (ref 9.4–12.4)
NEUTROPHILS # BLD AUTO: 7.03 K/UL (ref 1.8–7.7)
NEUTROPHILS NFR BLD AUTO: 63.8 % (ref 53–65)
NONHDLC SERPL-MCNC: 88 MG/DL
NRBC # BLD AUTO: 0 X10E3/UL
NRBC, AUTO (.00): 0 %
PLATELET # BLD AUTO: 441 K/UL (ref 150–400)
POTASSIUM SERPL-SCNC: 3.6 MMOL/L (ref 3.5–5.1)
PROT SERPL-MCNC: 6.8 G/DL (ref 6.4–8.3)
RBC # BLD AUTO: 4.57 M/UL (ref 4.2–5.4)
SODIUM SERPL-SCNC: 140 MMOL/L (ref 136–145)
TRIGL SERPL-MCNC: 195 MG/DL (ref 37–140)
VLDLC SERPL-MCNC: 39 MG/DL
WBC # BLD AUTO: 11.05 K/UL (ref 4.5–11)

## 2025-02-10 PROCEDURE — 85025 COMPLETE CBC W/AUTO DIFF WBC: CPT | Mod: ,,, | Performed by: CLINICAL MEDICAL LABORATORY

## 2025-02-10 PROCEDURE — 99214 OFFICE O/P EST MOD 30 MIN: CPT | Mod: ,,, | Performed by: NURSE PRACTITIONER

## 2025-02-10 PROCEDURE — 80061 LIPID PANEL: CPT | Mod: ,,, | Performed by: CLINICAL MEDICAL LABORATORY

## 2025-02-10 PROCEDURE — 80053 COMPREHEN METABOLIC PANEL: CPT | Mod: ,,, | Performed by: CLINICAL MEDICAL LABORATORY

## 2025-02-10 RX ORDER — HYDROXYZINE HYDROCHLORIDE 25 MG/1
25 TABLET, FILM COATED ORAL 3 TIMES DAILY
Qty: 30 TABLET | Refills: 0 | Status: CANCELLED | OUTPATIENT
Start: 2025-02-10

## 2025-02-10 RX ORDER — ROSUVASTATIN CALCIUM 20 MG/1
20 TABLET, COATED ORAL DAILY
Qty: 30 TABLET | Refills: 5 | Status: SHIPPED | OUTPATIENT
Start: 2025-02-10 | End: 2025-02-11

## 2025-02-10 RX ORDER — HYDROXYZINE HYDROCHLORIDE 50 MG/1
50 TABLET, FILM COATED ORAL 4 TIMES DAILY PRN
Qty: 60 TABLET | Refills: 2 | Status: SHIPPED | OUTPATIENT
Start: 2025-02-10

## 2025-02-10 RX ORDER — TRAZODONE HYDROCHLORIDE 100 MG/1
100 TABLET ORAL NIGHTLY
Qty: 30 TABLET | Refills: 5 | Status: SHIPPED | OUTPATIENT
Start: 2025-02-10

## 2025-02-10 RX ORDER — ESCITALOPRAM OXALATE 20 MG/1
40 TABLET ORAL DAILY
Qty: 60 TABLET | Refills: 5 | Status: SHIPPED | OUTPATIENT
Start: 2025-02-10

## 2025-02-10 RX ORDER — BUSPIRONE HYDROCHLORIDE 7.5 MG/1
7.5 TABLET ORAL 3 TIMES DAILY
Qty: 90 TABLET | Refills: 5 | Status: CANCELLED | OUTPATIENT
Start: 2025-02-10 | End: 2026-02-10

## 2025-02-10 NOTE — PROGRESS NOTES
MADIHA Michaud   [facility]  [address] [address] [address]  [phone]      PATIENT NAME: Jaleesa Moreira  : 1980  DATE: 2/10/25  MRN: [id number]      Billing Provider: MADIHA Michaud  Level of Service:   Patient PCP Information       Provider PCP Type    Kimi Travis NP General                Medications and Allergies     Medications  Outpatient Medications Marked as Taking for the 2/10/25 encounter (Office Visit) with Henrietta Esquivel FNP   Medication Sig Dispense Refill    busPIRone (BUSPAR) 7.5 MG tablet Take 1 tablet (7.5 mg total) by mouth 3 (three) times daily. 90 tablet 5    sumatriptan (IMITREX) 100 MG tablet Take at onset of headache, may repeat in 2 hours if unrelieved. 20 tablet 1    [DISCONTINUED] EScitalopram oxalate (LEXAPRO) 20 MG tablet Take 2 tablets (40 mg total) by mouth once daily. 60 tablet 5    [DISCONTINUED] hydrOXYzine HCL (ATARAX) 25 MG tablet Take 1 tablet (25 mg total) by mouth 3 (three) times daily. 30 tablet 0    [DISCONTINUED] rosuvastatin (CRESTOR) 20 MG tablet Take 1 tablet (20 mg total) by mouth once daily. 90 tablet 3    [DISCONTINUED] traZODone (DESYREL) 100 MG tablet TAKE 1 TABLET BY MOUTH DAILY AT BEDTIME. 30 tablet 5       Allergies  Review of patient's allergies indicates:  No Known Allergies    History of Present Illness    CHIEF COMPLAINT:  Patient presents today for follow up and medication refills    PSYCHIATRIC HISTORY:  She has not taken Lexapro since December due to a missed appointment, reporting increased irritability and mood changes since discontinuation. Previously, she was taking Lexapro 40 mg daily in the morning, with an alternative regimen of Lexapro 20 mg twice daily. She has alternated between Vistaril and Buspar due to medication tolerance development. She takes Trazodone at bedtime for sleep.    ALLERGIES:  She has allergies to tick bites, bees, and wasps. A previous tick  bite resulted in cellulitis and spreading reaction, requiring treatment with standard protocol for [place] fever/Lyme disease.      ROS:  General: -fever, -chills, -fatigue, -weight gain, -weight loss  Eyes: -vision changes, -redness, -discharge  ENT: -ear pain, -nasal congestion, -sore throat  Cardiovascular: -chest pain, -palpitations, -lower extremity edema  Respiratory: -cough, -shortness of breath  Gastrointestinal: -abdominal pain, -nausea, -vomiting, -diarrhea, -constipation, -blood in stool  Genitourinary: -dysuria, -hematuria, -frequency  Musculoskeletal: -joint pain, -muscle pain  Skin: -rash, -lesion  Neurological: -headache, -dizziness, -numbness, -tingling  Psychiatric: +anxiety, +depression, -sleep difficulty, +irritability          Physical Exam    General: No acute distress. Well-developed. Well-nourished.  Eyes: EOMI. Sclerae anicteric.  HENT: Normocephalic. Atraumatic.   Cardiovascular: Regular rate. Regular rhythm.   Respiratory: Normal respiratory effort. Clear to auscultation bilaterally.   Abdomen: Soft. Non-tender. Non-distended. Normoactive bowel sounds.  Musculoskeletal: No  obvious deformity.  Extremities: No lower extremity edema.  Neurological: Alert & oriented x3. No slurred speech. Normal gait.  Psychiatric: Normal mood. Normal affect.   Skin: Warm. Dry. No rash.          Assessment & Plan    IMPRESSION:  - Assessed current medication regimen, including hydroxyzine for anxiety and itching  - Evaluated options for managing anxiety, considering patient reported being off Lexapro since December and experiencing mood changes  - Will increase hydroxyzine dose to address anxiety symptoms  - Discontinued Buspar    ANXIETY:  - Increased hydroxyzine dose to be taken 3 times daily as needed for anxiety.  - Discontinued Buspar.  - Refilled Lexapro 40 mg daily, to be taken in the morning.  - Patient reports experiencing anxiety and noticing a difference without Lexapro, as well as being  irritable and [place].  - Discussed medication options with the patient.  - Advised the patient about the option to split the Lexapro dose if needed.    SLEEP DISORDER:  - Continued Trazodone for nighttime use.  - Continued Trazodone at night for sleep management.    ALLERGIES:  - Noted the patient's reported allergy to bees.  - Noted the patient's reported allergy to wasps and other insects.  - Increased hydroxyzine dose to be taken 3 times daily as needed for itching.    TICK-BORNE ILLNESS:  - Noted the patient's history of a tick bite that led to cellulitis.  - Confirmed previous treatment with a course of medication for potential Lyme disease.    LACTOSE INTOLERANCE:  - Noted the patient's lactose intolerance.    FOLLOW UP:  - Scheduled follow up in 3 months to assess effectiveness of medication changes.          Health Maintenance Due   Topic Date Due    Hepatitis C Screening  Never done    HIV Screening  Never done    TETANUS VACCINE  Never done    Mammogram  Never done    Pneumococcal Vaccines (Age 0-49) (1 of 2 - PCV) Never done    Influenza Vaccine (1) 09/01/2024    COVID-19 Vaccine (1 - 2024-25 season) Never done       Problem List Items Addressed This Visit          Psychiatric    Anxiety - Primary    Relevant Medications    EScitalopram oxalate (LEXAPRO) 20 MG tablet    hydrOXYzine (ATARAX) 50 MG tablet       Cardiac/Vascular    Mixed hyperlipidemia    Overview     Fasting lipids ordered, continue current medication and LFLC diet. LDL goal < 70         Relevant Medications    rosuvastatin (CRESTOR) 20 MG tablet    Other Relevant Orders    Comprehensive Metabolic Panel    Lipid Panel     Other Visit Diagnoses       Depression, unspecified depression type        Will check routine labs and continue current medications.     Relevant Medications    EScitalopram oxalate (LEXAPRO) 20 MG tablet    Insomnia, unspecified type        Relevant Medications    traZODone (DESYREL) 100 MG tablet    Other long term  (current) drug therapy        Relevant Orders    CBC Auto Differential            Health Maintenance Topics with due status: Not Due       Topic Last Completion Date    Hemoglobin A1c (Diabetic Prevention Screening) 03/04/2024    RSV Vaccine (Age 60+ and Pregnant patients) Not Due       Future Appointments   Date Time Provider Department Center   8/11/2025  9:40 AM [facility]        This note was generated with the assistance of ambient listening technology. Verbal consent was obtained by the patient and accompanying visitor(s) for the recording of patient appointment to facilitate this note. I attest to having reviewed and edited the generated note for accuracy, though some syntax or spelling errors may persist. Please contact the author of this note for any clarification.     Signature:  MADIHA Michaud  [facility] [address] [place] [address]  [phone]    Date of encounter: 2/10/25

## 2025-02-11 DIAGNOSIS — E78.2 MIXED HYPERLIPIDEMIA: ICD-10-CM

## 2025-02-11 RX ORDER — ROSUVASTATIN CALCIUM 20 MG/1
40 TABLET, COATED ORAL DAILY
Qty: 60 TABLET | Refills: 5 | Status: SHIPPED | OUTPATIENT
Start: 2025-02-11 | End: 2026-02-11

## 2025-03-25 ENCOUNTER — TELEPHONE (OUTPATIENT)
Dept: FAMILY MEDICINE | Facility: CLINIC | Age: 45
End: 2025-03-25

## 2025-03-25 NOTE — TELEPHONE ENCOUNTER
Spoke with patient in regards to scheduling mammogram. She declined at this time, stating she has new insurance and would need to check with them first.

## 2025-04-08 ENCOUNTER — TELEPHONE (OUTPATIENT)
Dept: FAMILY MEDICINE | Facility: CLINIC | Age: 45
End: 2025-04-08

## 2025-04-08 DIAGNOSIS — Z53.20 SCREENING MAMMOGRAPHY DECLINED: ICD-10-CM

## 2025-04-08 DIAGNOSIS — F17.210 CIGARETTE NICOTINE DEPENDENCE WITHOUT COMPLICATION: Primary | ICD-10-CM

## 2025-04-08 NOTE — TELEPHONE ENCOUNTER
Smoking Cessation referral sent to [place] tobacco quit line 4/8/2025. Mammogram not scheduled due to patient having no insurance coverage currently.

## 2025-05-12 RX ORDER — KETOROLAC TROMETHAMINE 10 MG/1
10 TABLET, FILM COATED ORAL EVERY 6 HOURS
Qty: 20 TABLET | Refills: 1 | Status: SHIPPED | OUTPATIENT
Start: 2025-05-12

## 2025-05-13 DIAGNOSIS — Z79.899 OTHER LONG TERM (CURRENT) DRUG THERAPY: ICD-10-CM

## 2025-05-13 DIAGNOSIS — E78.2 MIXED HYPERLIPIDEMIA: Primary | ICD-10-CM

## 2025-05-14 ENCOUNTER — RESULTS FOLLOW-UP (OUTPATIENT)
Dept: FAMILY MEDICINE | Facility: CLINIC | Age: 45
End: 2025-05-14

## 2025-05-14 DIAGNOSIS — E78.2 MIXED HYPERLIPIDEMIA: Primary | ICD-10-CM

## 2025-05-14 RX ORDER — ATORVASTATIN CALCIUM 80 MG/1
80 TABLET, FILM COATED ORAL DAILY
Qty: 90 TABLET | Refills: 1 | Status: SHIPPED | OUTPATIENT
Start: 2025-05-14 | End: 2026-05-14

## 2025-05-14 RX ORDER — ROSUVASTATIN CALCIUM 40 MG/1
40 TABLET, COATED ORAL NIGHTLY
Qty: 90 TABLET | Refills: 0 | Status: SHIPPED | OUTPATIENT
Start: 2025-05-14 | End: 2025-05-14

## 2025-05-14 NOTE — PROGRESS NOTES
Spoke with pt she did say to tell you she has already been on 2 20mg rosuvastatin a day so what conn sshe need to take now ?

## 2025-05-15 ENCOUNTER — TELEPHONE (OUTPATIENT)
Dept: RADIOLOGY | Facility: CLINIC | Age: 45
End: 2025-05-15

## 2025-05-15 NOTE — TELEPHONE ENCOUNTER
discussed dosage change on atorvastatin to 80mg pt voiced understanding with no questions. Pt did say she was started a fish oil that was recommended by the pharmacist

## 2025-08-11 ENCOUNTER — OFFICE VISIT (OUTPATIENT)
Dept: FAMILY MEDICINE | Facility: CLINIC | Age: 45
End: 2025-08-11

## 2025-08-11 VITALS
DIASTOLIC BLOOD PRESSURE: 86 MMHG | HEART RATE: 85 BPM | SYSTOLIC BLOOD PRESSURE: 132 MMHG | BODY MASS INDEX: 28.95 KG/M2 | HEIGHT: 57 IN | TEMPERATURE: 98 F | WEIGHT: 134.19 LBS | RESPIRATION RATE: 18 BRPM | OXYGEN SATURATION: 95 %

## 2025-08-11 DIAGNOSIS — E78.2 MIXED HYPERLIPIDEMIA: Primary | ICD-10-CM

## 2025-08-11 DIAGNOSIS — F32.A DEPRESSION, UNSPECIFIED DEPRESSION TYPE: ICD-10-CM

## 2025-08-11 DIAGNOSIS — G47.00 INSOMNIA, UNSPECIFIED TYPE: ICD-10-CM

## 2025-08-11 DIAGNOSIS — Z79.899 OTHER LONG TERM (CURRENT) DRUG THERAPY: ICD-10-CM

## 2025-08-11 DIAGNOSIS — F41.9 ANXIETY: ICD-10-CM

## 2025-08-11 DIAGNOSIS — G43.909 MIGRAINE WITHOUT STATUS MIGRAINOSUS, NOT INTRACTABLE, UNSPECIFIED MIGRAINE TYPE: ICD-10-CM

## 2025-08-11 LAB
ALBUMIN SERPL BCP-MCNC: 3.8 G/DL (ref 3.5–5)
ALBUMIN/GLOB SERPL: 1.1 {RATIO}
ALP SERPL-CCNC: 86 U/L (ref 40–150)
ALT SERPL W P-5'-P-CCNC: 21 U/L
ANION GAP SERPL CALCULATED.3IONS-SCNC: 12 MMOL/L (ref 7–16)
AST SERPL W P-5'-P-CCNC: 22 U/L (ref 11–45)
BASOPHILS # BLD AUTO: 0.07 K/UL (ref 0–0.2)
BASOPHILS NFR BLD AUTO: 0.7 % (ref 0–1)
BILIRUB SERPL-MCNC: 0.4 MG/DL
BUN SERPL-MCNC: 11 MG/DL (ref 7–19)
BUN/CREAT SERPL: 15 (ref 6–20)
CALCIUM SERPL-MCNC: 9.4 MG/DL (ref 8.4–10.2)
CHLORIDE SERPL-SCNC: 108 MMOL/L (ref 98–107)
CHOLEST SERPL-MCNC: 137 MG/DL
CHOLEST/HDLC SERPL: 2.8 {RATIO}
CO2 SERPL-SCNC: 26 MMOL/L (ref 22–29)
CREAT SERPL-MCNC: 0.71 MG/DL (ref 0.55–1.02)
DIFFERENTIAL METHOD BLD: ABNORMAL
EGFR (NO RACE VARIABLE) (RUSH/TITUS): 108 ML/MIN/1.73M2
EOSINOPHIL # BLD AUTO: 0.23 K/UL (ref 0–0.5)
EOSINOPHIL NFR BLD AUTO: 2.2 % (ref 1–4)
ERYTHROCYTE [DISTWIDTH] IN BLOOD BY AUTOMATED COUNT: 13.1 % (ref 11.5–14.5)
GLOBULIN SER-MCNC: 3.5 G/DL (ref 2–4)
GLUCOSE SERPL-MCNC: 79 MG/DL (ref 74–100)
HCT VFR BLD AUTO: 45.6 % (ref 38–47)
HDLC SERPL-MCNC: 49 MG/DL (ref 35–60)
HGB BLD-MCNC: 15.1 G/DL (ref 12–16)
IMM GRANULOCYTES # BLD AUTO: 0.06 K/UL (ref 0–0.04)
IMM GRANULOCYTES NFR BLD: 0.6 % (ref 0–0.4)
LDLC SERPL CALC-MCNC: 49 MG/DL
LDLC/HDLC SERPL: 1 {RATIO}
LYMPHOCYTES # BLD AUTO: 2.9 K/UL (ref 1–4.8)
LYMPHOCYTES NFR BLD AUTO: 28.3 % (ref 27–41)
MCH RBC QN AUTO: 32.4 PG (ref 27–31)
MCHC RBC AUTO-ENTMCNC: 33.1 G/DL (ref 32–36)
MCV RBC AUTO: 97.9 FL (ref 80–96)
MONOCYTES # BLD AUTO: 0.67 K/UL (ref 0–0.8)
MONOCYTES NFR BLD AUTO: 6.5 % (ref 2–6)
MPC BLD CALC-MCNC: 9.6 FL (ref 9.4–12.4)
NEUTROPHILS # BLD AUTO: 6.31 K/UL (ref 1.8–7.7)
NEUTROPHILS NFR BLD AUTO: 61.7 % (ref 53–65)
NONHDLC SERPL-MCNC: 88 MG/DL
NRBC # BLD AUTO: 0 X10E3/UL
NRBC, AUTO (.00): 0 %
PLATELET # BLD AUTO: 403 K/UL (ref 150–400)
POTASSIUM SERPL-SCNC: 3.8 MMOL/L (ref 3.5–5.1)
PROT SERPL-MCNC: 7.3 G/DL (ref 6.4–8.3)
RBC # BLD AUTO: 4.66 M/UL (ref 4.2–5.4)
SODIUM SERPL-SCNC: 142 MMOL/L (ref 136–145)
TRIGL SERPL-MCNC: 197 MG/DL (ref 37–140)
VLDLC SERPL-MCNC: 39 MG/DL
WBC # BLD AUTO: 10.24 K/UL (ref 4.5–11)

## 2025-08-11 PROCEDURE — 99214 OFFICE O/P EST MOD 30 MIN: CPT | Mod: ,,, | Performed by: NURSE PRACTITIONER

## 2025-08-11 PROCEDURE — 85025 COMPLETE CBC W/AUTO DIFF WBC: CPT | Mod: ,,, | Performed by: CLINICAL MEDICAL LABORATORY

## 2025-08-11 PROCEDURE — 80053 COMPREHEN METABOLIC PANEL: CPT | Mod: ,,, | Performed by: CLINICAL MEDICAL LABORATORY

## 2025-08-11 PROCEDURE — 80061 LIPID PANEL: CPT | Mod: ,,, | Performed by: CLINICAL MEDICAL LABORATORY

## 2025-08-11 RX ORDER — KETOROLAC TROMETHAMINE 10 MG/1
10 TABLET, FILM COATED ORAL EVERY 6 HOURS
Qty: 20 TABLET | Refills: 1 | Status: SHIPPED | OUTPATIENT
Start: 2025-08-11

## 2025-08-11 RX ORDER — ESCITALOPRAM OXALATE 20 MG/1
40 TABLET ORAL DAILY
Qty: 60 TABLET | Refills: 5 | Status: SHIPPED | OUTPATIENT
Start: 2025-08-11

## 2025-08-11 RX ORDER — HYDROXYZINE HYDROCHLORIDE 50 MG/1
50 TABLET, FILM COATED ORAL 4 TIMES DAILY PRN
Qty: 60 TABLET | Refills: 2 | Status: SHIPPED | OUTPATIENT
Start: 2025-08-11

## 2025-08-11 RX ORDER — ATORVASTATIN CALCIUM 80 MG/1
80 TABLET, FILM COATED ORAL DAILY
Qty: 90 TABLET | Refills: 1 | Status: SHIPPED | OUTPATIENT
Start: 2025-08-11 | End: 2026-08-11

## 2025-08-11 RX ORDER — TRAZODONE HYDROCHLORIDE 100 MG/1
100 TABLET ORAL NIGHTLY
Qty: 30 TABLET | Refills: 5 | Status: SHIPPED | OUTPATIENT
Start: 2025-08-11

## 2025-08-11 RX ORDER — BUSPIRONE HYDROCHLORIDE 7.5 MG/1
7.5 TABLET ORAL 3 TIMES DAILY
Qty: 90 TABLET | Refills: 5 | Status: SHIPPED | OUTPATIENT
Start: 2025-08-11 | End: 2026-08-11